# Patient Record
Sex: MALE | Race: WHITE | NOT HISPANIC OR LATINO
[De-identification: names, ages, dates, MRNs, and addresses within clinical notes are randomized per-mention and may not be internally consistent; named-entity substitution may affect disease eponyms.]

---

## 2021-02-16 PROBLEM — Z00.00 ENCOUNTER FOR PREVENTIVE HEALTH EXAMINATION: Status: ACTIVE | Noted: 2021-02-16

## 2021-02-17 ENCOUNTER — APPOINTMENT (OUTPATIENT)
Dept: GASTROENTEROLOGY | Facility: CLINIC | Age: 66
End: 2021-02-17
Payer: COMMERCIAL

## 2021-02-17 VITALS
SYSTOLIC BLOOD PRESSURE: 130 MMHG | WEIGHT: 223 LBS | BODY MASS INDEX: 33.03 KG/M2 | HEART RATE: 73 BPM | HEIGHT: 69 IN | DIASTOLIC BLOOD PRESSURE: 83 MMHG

## 2021-02-17 DIAGNOSIS — Z12.11 ENCOUNTER FOR SCREENING FOR MALIGNANT NEOPLASM OF COLON: ICD-10-CM

## 2021-02-17 PROCEDURE — 99072 ADDL SUPL MATRL&STAF TM PHE: CPT

## 2021-02-17 PROCEDURE — 99202 OFFICE O/P NEW SF 15 MIN: CPT

## 2021-02-17 RX ORDER — ATORVASTATIN CALCIUM 20 MG/1
20 TABLET, FILM COATED ORAL
Refills: 0 | Status: ACTIVE | COMMUNITY

## 2021-02-17 NOTE — PHYSICAL EXAM
[General Appearance - Alert] : alert [General Appearance - In No Acute Distress] : in no acute distress [Auscultation Breath Sounds / Voice Sounds] : lungs were clear to auscultation bilaterally [Heart Rate And Rhythm] : heart rate was normal and rhythm regular [Heart Sounds] : normal S1 and S2 [Heart Sounds Gallop] : no gallops [Heart Sounds Pericardial Friction Rub] : no pericardial rub [Murmurs] : no murmurs [Bowel Sounds] : normal bowel sounds [Abdomen Soft] : soft [Abdomen Tenderness] : non-tender [Abdomen Mass (___ Cm)] : no abdominal mass palpated [] : no hepato-splenomegaly

## 2021-02-17 NOTE — HISTORY OF PRESENT ILLNESS
[de-identified] : Mr. LIS IZQUIERDO is a 65 year old male presents for screening colonoscopy. Patient has no complaints of bowel issues, bleeding, abdominal pain, family history of colon cancer, GERD symptoms. Patient had last colonoscopy in 2011. Patient has a personal history of colon polyps.\par \par

## 2021-08-24 VITALS
BODY MASS INDEX: 33.92 KG/M2 | SYSTOLIC BLOOD PRESSURE: 112 MMHG | HEIGHT: 69 IN | TEMPERATURE: 96.9 F | DIASTOLIC BLOOD PRESSURE: 64 MMHG | WEIGHT: 229 LBS

## 2022-04-13 ENCOUNTER — APPOINTMENT (OUTPATIENT)
Dept: RHEUMATOLOGY | Facility: CLINIC | Age: 67
End: 2022-04-13
Payer: COMMERCIAL

## 2022-04-13 VITALS
HEIGHT: 69 IN | OXYGEN SATURATION: 97 % | TEMPERATURE: 97.6 F | DIASTOLIC BLOOD PRESSURE: 76 MMHG | SYSTOLIC BLOOD PRESSURE: 110 MMHG | HEART RATE: 92 BPM | RESPIRATION RATE: 17 BRPM

## 2022-04-13 DIAGNOSIS — Z82.69 FAMILY HISTORY OF OTHER DISEASES OF THE MUSCULOSKELETAL SYSTEM AND CONNECTIVE TISSUE: ICD-10-CM

## 2022-04-13 DIAGNOSIS — Z80.0 FAMILY HISTORY OF MALIGNANT NEOPLASM OF DIGESTIVE ORGANS: ICD-10-CM

## 2022-04-13 DIAGNOSIS — Z82.5 FAMILY HISTORY OF ASTHMA AND OTHER CHRONIC LOWER RESPIRATORY DISEASES: ICD-10-CM

## 2022-04-13 LAB — URATE UR-MCNC: 12.5 MG/DL

## 2022-04-13 PROCEDURE — 99204 OFFICE O/P NEW MOD 45 MIN: CPT

## 2022-04-14 RX ORDER — FUROSEMIDE 40 MG/1
40 TABLET ORAL
Refills: 0 | Status: DISCONTINUED | COMMUNITY
End: 2022-04-14

## 2022-04-14 RX ORDER — METFORMIN HYDROCHLORIDE 500 MG/1
500 TABLET, COATED ORAL
Refills: 0 | Status: DISCONTINUED | COMMUNITY
End: 2022-04-14

## 2022-04-14 RX ORDER — METFORMIN ER 750 MG 750 MG/1
750 TABLET ORAL
Refills: 0 | Status: ACTIVE | COMMUNITY

## 2022-04-14 RX ORDER — MELOXICAM 15 MG/1
15 TABLET ORAL
Refills: 0 | Status: ACTIVE | COMMUNITY

## 2022-04-14 RX ORDER — ASPIRIN 81 MG
81 TABLET, DELAYED RELEASE (ENTERIC COATED) ORAL
Refills: 0 | Status: ACTIVE | COMMUNITY

## 2022-04-14 RX ORDER — IMIQUIMOD 50 MG/G
5 CREAM TOPICAL
Refills: 0 | Status: ACTIVE | COMMUNITY

## 2022-04-14 RX ORDER — SODIUM SULFATE, POTASSIUM SULFATE, MAGNESIUM SULFATE 17.5; 3.13; 1.6 G/ML; G/ML; G/ML
17.5-3.13-1.6 SOLUTION, CONCENTRATE ORAL
Qty: 1 | Refills: 0 | Status: DISCONTINUED | COMMUNITY
Start: 2021-02-17 | End: 2022-04-14

## 2022-05-18 ENCOUNTER — APPOINTMENT (OUTPATIENT)
Dept: RHEUMATOLOGY | Facility: CLINIC | Age: 67
End: 2022-05-18
Payer: COMMERCIAL

## 2022-05-18 VITALS
BODY MASS INDEX: 36.29 KG/M2 | HEART RATE: 94 BPM | SYSTOLIC BLOOD PRESSURE: 127 MMHG | RESPIRATION RATE: 17 BRPM | HEIGHT: 69 IN | OXYGEN SATURATION: 97 % | WEIGHT: 245 LBS | DIASTOLIC BLOOD PRESSURE: 80 MMHG | TEMPERATURE: 98 F

## 2022-05-18 PROCEDURE — 99214 OFFICE O/P EST MOD 30 MIN: CPT

## 2022-05-18 RX ORDER — BUMETANIDE 2 MG/1
2 TABLET ORAL
Refills: 0 | Status: DISCONTINUED | COMMUNITY
End: 2022-05-18

## 2022-05-18 RX ORDER — DEXAMETHASONE 2 MG/1
2 TABLET ORAL
Qty: 7 | Refills: 0 | Status: DISCONTINUED | COMMUNITY
Start: 2022-04-14 | End: 2022-05-18

## 2022-07-20 ENCOUNTER — APPOINTMENT (OUTPATIENT)
Dept: RHEUMATOLOGY | Facility: CLINIC | Age: 67
End: 2022-07-20

## 2022-07-20 VITALS
HEART RATE: 102 BPM | HEIGHT: 69 IN | RESPIRATION RATE: 17 BRPM | TEMPERATURE: 97.6 F | BODY MASS INDEX: 35.55 KG/M2 | DIASTOLIC BLOOD PRESSURE: 65 MMHG | OXYGEN SATURATION: 96 % | SYSTOLIC BLOOD PRESSURE: 110 MMHG | WEIGHT: 240 LBS

## 2022-07-20 DIAGNOSIS — M13.0 POLYARTHRITIS, UNSPECIFIED: ICD-10-CM

## 2022-07-20 DIAGNOSIS — T50.4X5A: ICD-10-CM

## 2022-07-20 DIAGNOSIS — M25.50 PAIN IN UNSPECIFIED JOINT: ICD-10-CM

## 2022-07-20 PROCEDURE — 99214 OFFICE O/P EST MOD 30 MIN: CPT

## 2022-08-24 PROBLEM — M25.50 POLYARTHRALGIA: Status: ACTIVE | Noted: 2022-04-13

## 2022-08-24 PROBLEM — M13.0 POLYARTHRITIS: Status: ACTIVE | Noted: 2022-04-13

## 2022-08-24 PROBLEM — T50.4X5A: Status: ACTIVE | Noted: 2022-04-14

## 2022-10-10 RX ORDER — FEBUXOSTAT 80 MG/1
80 TABLET ORAL DAILY
Qty: 30 | Refills: 4 | Status: ACTIVE | COMMUNITY
Start: 1900-01-01 | End: 1900-01-01

## 2022-10-26 ENCOUNTER — APPOINTMENT (OUTPATIENT)
Dept: RHEUMATOLOGY | Facility: CLINIC | Age: 67
End: 2022-10-26

## 2022-11-09 ENCOUNTER — APPOINTMENT (OUTPATIENT)
Dept: RHEUMATOLOGY | Facility: CLINIC | Age: 67
End: 2022-11-09

## 2022-11-09 VITALS
HEART RATE: 96 BPM | SYSTOLIC BLOOD PRESSURE: 115 MMHG | DIASTOLIC BLOOD PRESSURE: 70 MMHG | OXYGEN SATURATION: 97 % | TEMPERATURE: 97.4 F

## 2022-11-09 DIAGNOSIS — E11.9 TYPE 2 DIABETES MELLITUS W/OUT COMPLICATIONS: ICD-10-CM

## 2022-11-09 DIAGNOSIS — Z79.899 ENCOUNTER FOR THERAPEUTIC DRUG LVL MONITORING: ICD-10-CM

## 2022-11-09 DIAGNOSIS — N18.9 CHRONIC KIDNEY DISEASE, UNSPECIFIED: ICD-10-CM

## 2022-11-09 DIAGNOSIS — M1A.09X0 IDIOPATHIC CHRONIC GOUT, MULTIPLE SITES, W/OUT TOPHUS (TOPHI): ICD-10-CM

## 2022-11-09 DIAGNOSIS — Z51.81 ENCOUNTER FOR THERAPEUTIC DRUG LVL MONITORING: ICD-10-CM

## 2022-11-09 DIAGNOSIS — I25.10 ATHEROSCLEROTIC HEART DISEASE OF NATIVE CORONARY ARTERY W/OUT ANGINA PECTORIS: ICD-10-CM

## 2022-11-09 PROCEDURE — 99214 OFFICE O/P EST MOD 30 MIN: CPT

## 2022-11-09 RX ORDER — TRAMADOL HYDROCHLORIDE 50 MG/1
50 TABLET, COATED ORAL
Qty: 50 | Refills: 0 | Status: ACTIVE | COMMUNITY
Start: 2022-08-25

## 2022-11-09 RX ORDER — METHYLPREDNISOLONE 4 MG/1
4 TABLET ORAL
Qty: 30 | Refills: 2 | Status: DISCONTINUED | COMMUNITY
Start: 2022-07-20 | End: 2022-11-09

## 2022-11-09 NOTE — PHYSICAL EXAM
[General Appearance - Alert] : alert [General Appearance - In No Acute Distress] : in no acute distress [Sclera] : the sclera and conjunctiva were normal [PERRL With Normal Accommodation] : pupils were equal in size, round, and reactive to light [Extraocular Movements] : extraocular movements were intact [Outer Ear] : the ears and nose were normal in appearance [Oropharynx] : the oropharynx was normal [Neck Appearance] : the appearance of the neck was normal [Neck Cervical Mass (___cm)] : no neck mass was observed [Jugular Venous Distention Increased] : there was no jugular-venous distention [Thyroid Diffuse Enlargement] : the thyroid was not enlarged [Thyroid Nodule] : there were no palpable thyroid nodules [No CVA Tenderness] : no ~M costovertebral angle tenderness [No Spinal Tenderness] : no spinal tenderness [Nail Clubbing] : no clubbing  or cyanosis of the fingernails [Skin Color & Pigmentation] : normal skin color and pigmentation [Skin Turgor] : normal skin turgor [] : no rash [Oriented To Time, Place, And Person] : oriented to person, place, and time [FreeTextEntry1] : Hands- Mild OA changes in B/L hands. L #3 PIP swelling. \par Wrists- Bilateral CMC tenderness \par Elbows- L elbow mild swelling \par Shoulders- normal\par Hips- Reduced external rotation bilaterally.\par Knees- L>R knee swelling and warmth. Patellofemoral crepitus bilaterally without effusion.\par Ankles- B/L swelling and warmth \par Feet- B/L swelling

## 2022-11-09 NOTE — ADDENDUM
[FreeTextEntry1] : I, Flaca Garcia wrote this note acting as a scribe for Dr. Gopi Arteaga MD on Nov 09, 2022 .\par \par I, Dr. Gopi Arteaga MD, ordering physician, have read and attest that all the information, medical decision making and discharge instructions within are true and accurate on 11/09/2022.

## 2022-11-09 NOTE — ASSESSMENT
[FreeTextEntry1] : 67 y/o M with chronic gout consisting of polyarthritis a/w elevated uric acid. He has had a persistent flare recently likely due to incomplete treatment of primary attack. His case is complicated by having CAD and recently placed on febuxostat that has a black box warning for cardiovascular events, which would not be a preferred urate-lowering therapy. He has had allergic reaction and severe GI distress from allopurinol. Furthermore, he has had GI intolerance to colchicine as well. He has tried NSAIDs including indomethacin and meloxicam for joint pain relief which was minimal. Additional gout disease complications is oral hypoglycemic controlled Type II DM which will be affected greatly by steroid dosing and Stage III CKD which increases his risk for gout and complicate probenecid use.  He is also on losartan which has proved to be uricosuric as well, which we will plan tto continue. He has recently been placed on loop diuretics with furosemide and bumetanide, which also increases urate levels and gout flare risks.  This overall makes his chronic gout complicated to both treat acutely in flares and in prophylaxis/prevention of flares with urate lowering agents, as most of the therapies would have generalized to severe contraindications.\par presents for an initial evaluation of gout, currently affecting B/L ankles. He has gout involvement of multiple joints. For his recent flare over the last 4 weeks, he was started on Dexamethasone 2mg BID and Meloxicam 15mg yesterday by PCP and had labwork done which I currently don't have.  He has so far noted improvements in his pain/swelling.\par \par On exam pt exhibits joint swelling in L #3 digit, L elbow, L>R knee, B/L ankles, and warmth in L knee and ankles. \par We discussed multiple treatment options due to current medical conditions and tolerance of medications. Options include Krystexxa infusions for urate-lowering in place of Febuxostat due to cardiac risk, and Kineret injections and sparingly used colchicine for flare treatment.  Will obtain updated labs and urinary uric acid levels to further evaluate his gout and discuss further pending results. Will remain on current med regimen for now and give additional prolonged oral steroid course after completing current course to ensure completion of flare treatment. The patient was given an opportunity to ask questions and all questions were answered to their satisfaction. \par \par - Will obtain labs drawn yesterday with PCP.\par - c/w Febuxostat 80mg/d  - consider Krystexxa infusions if UA increases in next labs \par - Defers use of Kineret 100mg SQ x 5 days for gout flare at this time \par - c/w all other home medications as prescribed  \par \par RTO 3-4 mos

## 2022-11-09 NOTE — HISTORY OF PRESENT ILLNESS
[___ Week(s) Ago] : [unfilled] week(s) ago [FreeTextEntry1] : -Pt overall feeling well. with gout medication. Pt states having no recent gout flares. \par -Pt states having recent L THR with complications post surgery. Had to be hospitalized after surgery again because he was found unresponsive. walks around with reacher now. Completing PE s/p rehab.\par - He remains on Febuxostat 80mg/d and tolerates well. Uses tramadol prn.\par -Pt states stopped taking Meloxicam after L THR. \par -Labs were done yesterday with his PCP, UA done. \par - ROS remains unchanged otherwise.

## 2022-12-15 ENCOUNTER — APPOINTMENT (OUTPATIENT)
Dept: NEUROLOGY | Facility: CLINIC | Age: 67
End: 2022-12-15

## 2022-12-15 VITALS — HEIGHT: 69 IN | BODY MASS INDEX: 32.58 KG/M2 | WEIGHT: 220 LBS

## 2022-12-15 DIAGNOSIS — Z78.9 OTHER SPECIFIED HEALTH STATUS: ICD-10-CM

## 2022-12-15 DIAGNOSIS — G91.2 (IDIOPATHIC) NORMAL PRESSURE HYDROCEPHALUS: ICD-10-CM

## 2022-12-15 PROCEDURE — 99204 OFFICE O/P NEW MOD 45 MIN: CPT

## 2022-12-15 NOTE — DATA REVIEWED
[de-identified] : Had a report of an MRI of his brain done during a hospitalization at [facility] in September 2022.  It showed an old right basal ganglia stroke.  It showed chronic small vessel ischemic type changes.  And there is noted that his ventricles were prominent, possibly due to atrophy.

## 2022-12-15 NOTE — HISTORY OF PRESENT ILLNESS
[FreeTextEntry1] : MemoryInitial office visit December 15, 2022:\par This is a 67-year-old man who presents today with the difficulty walking, loss and tremor.  He has had this problem the walking and memory for several years.  He has a shuffling almost magnetic type gait.  He does not pick his feet up off the floor and his has a reduced swing of his arms.  He also has been having intermittent memory problems per the patient and his wife.  Recently he had an episode of what sounds like cough induced syncope.  He had this while driving and eating.  He also has urinary urgency but he is on diuretics for lower extremity edema.  He is here today for neurologic evaluation.

## 2022-12-15 NOTE — ASSESSMENT
[FreeTextEntry1] : This is a 67-year-old man with symptoms concerning for possibility of normal pressure hydrocephalus.  When I brought this up with him his wife stated that he was told about 6 or 7 years ago that he had normal pressure hydrocephalus but was not exhibiting such symptoms and did not believe it at the time.  At this does not look overtly like Parkinson's disease.  At this point I like to do a nuclear cisternogram to further evaluate for normal pressure hydrocephalus.  If positive will do a large-volume lumbar tap.  If this shows improvement in his gait or exam following the large-volume lumbar puncture I will send him for neurosurgical referral for possible  shunt placement.  I will call him with the results of the cisternogram and if positive at that point order the large-volume lumbar puncture.  Although he initially was complaining of tremor he had no tremor in the office today.  He states the tremor is more with movement rather than at rest.  I will see him back in the office in 3 months, sooner should the need arise.

## 2022-12-15 NOTE — REASON FOR VISIT
[Consultation] : a consultation visit [Other: _____] : [unfilled] [FreeTextEntry1] : Difficulty walking, memory loss, tremor

## 2022-12-15 NOTE — CONSULT LETTER
[Dear  ___] : Dear  [unfilled], [Consult Letter:] : I had the pleasure of evaluating your patient, [unfilled]. [Please see my note below.] : Please see my note below. [Consult Closing:] : Thank you very much for allowing me to participate in the care of this patient.  If you have any questions, please do not hesitate to contact me. [Sincerely,] : Sincerely, [FreeTextEntry3] : Cheng Cedeno M.D., Ph.D. DPN-N\par [facility]\par Neurology at [place]\par Medical Director of Stroke Services\par [facility]\par

## 2022-12-15 NOTE — REVIEW OF SYSTEMS
[As Noted in HPI] : as noted in HPI [Confused or Disoriented] : confusion [Memory Lapses or Loss] : memory loss [Difficulty Walking] : difficulty walking [Negative] : Heme/Lymph [de-identified] : Tremor

## 2022-12-15 NOTE — PHYSICAL EXAM
[General Appearance - Alert] : alert [General Appearance - In No Acute Distress] : in no acute distress [General Appearance - Well Nourished] : well nourished [General Appearance - Well Developed] : well developed [Person] : oriented to person [Place] : oriented to place [Time] : oriented to time [Short Term Intact] : short term memory intact [Remote Intact] : remote memory intact [Registration Intact] : recent registration memory intact [Span Intact] : the attention span was normal [Concentration Intact] : normal concentrating ability [Visual Intact] : visual attention was ~T not ~L decreased [Naming Objects] : no difficulty naming common objects [Repeating Phrases] : no difficulty repeating a phrase [Fluency] : fluency intact [Comprehension] : comprehension intact [Past History] : adequate knowledge of personal past history [Cranial Nerves Optic (II)] : visual acuity intact bilaterally,  visual fields full to confrontation, pupils equal round and reactive to light [Cranial Nerves Oculomotor (III)] : extraocular motion intact [Cranial Nerves Trigeminal (V)] : facial sensation intact symmetrically [Cranial Nerves Facial (VII)] : face symmetrical [Cranial Nerves Vestibulocochlear (VIII)] : hearing was intact bilaterally [Cranial Nerves Glossopharyngeal (IX)] : tongue and palate midline [Cranial Nerves Accessory (XI - Cranial And Spinal)] : head turning and shoulder shrug symmetric [Cranial Nerves Hypoglossal (XII)] : there was no tongue deviation with protrusion [Motor Tone] : muscle tone was normal in all four extremities [No Muscle Atrophy] : normal bulk in all four extremities [Paresis Pronator Drift Right-Sided] : no pronator drift on the right [Paresis Pronator Drift Left-Sided] : no pronator drift on the left [Motor Strength Upper Extremities Bilaterally] : strength was normal in both upper extremities [Motor Strength Lower Extremities Bilaterally] : there was weakness in both lower extremities [Sensation Tactile Decrease] : light touch was intact [Sensation Pain / Temperature Decrease] : pain and temperature was intact [Sensation Vibration Decrease] : vibration was intact [Proprioception] : proprioception was intact [Tremor] : no tremor present [Coordination - Dysmetria Impaired Finger-to-Nose Bilateral] : not present [1+] : Patella left 1+ [FreeTextEntry4] : 3 out of 3 recall [FreeTextEntry6] : Mild lower extremity weakness [FreeTextEntry8] : Magnetic type gait, reduced arm swing.  Typically uses walker but gait was tested without [Sclera] : the sclera and conjunctiva were normal [PERRL With Normal Accommodation] : pupils were equal in size, round, reactive to light, with normal accommodation [Extraocular Movements] : extraocular movements were intact [No APD] : no afferent pupillary defect [No WAYNE] : no internuclear ophthalmoplegia [Full Visual Field] : full visual field

## 2023-01-03 DIAGNOSIS — Z01.818 ENCOUNTER FOR OTHER PREPROCEDURAL EXAMINATION: ICD-10-CM

## 2023-01-17 ENCOUNTER — OUTPATIENT (OUTPATIENT)
Dept: OUTPATIENT SERVICES | Facility: HOSPITAL | Age: 68
LOS: 1 days | End: 2023-01-17
Payer: COMMERCIAL

## 2023-01-17 ENCOUNTER — TRANSCRIPTION ENCOUNTER (OUTPATIENT)
Age: 68
End: 2023-01-17

## 2023-01-17 VITALS
TEMPERATURE: 98 F | SYSTOLIC BLOOD PRESSURE: 117 MMHG | HEART RATE: 87 BPM | DIASTOLIC BLOOD PRESSURE: 62 MMHG | HEIGHT: 69 IN | WEIGHT: 220.02 LBS | OXYGEN SATURATION: 100 % | RESPIRATION RATE: 22 BRPM

## 2023-01-17 DIAGNOSIS — G91.2 (IDIOPATHIC) NORMAL PRESSURE HYDROCEPHALUS: ICD-10-CM

## 2023-01-17 LAB
APPEARANCE CSF: CLEAR — SIGNIFICANT CHANGE UP
COLOR CSF: SIGNIFICANT CHANGE UP
CSF COMMENTS: SIGNIFICANT CHANGE UP
CSF PCR RESULT: SIGNIFICANT CHANGE UP
GLUCOSE BLDC GLUCOMTR-MCNC: 101 MG/DL — HIGH (ref 70–99)
GLUCOSE CSF-MCNC: 76 MG/DLG/24H — HIGH (ref 40–70)
LDH CSF L TO P-CCNC: 15 U/L — SIGNIFICANT CHANGE UP
LDH FLD-CCNC: 15 U/L — SIGNIFICANT CHANGE UP
NEUTROPHILS # CSF: SIGNIFICANT CHANGE UP (ref 0–6)
NRBC NFR CSF: 0 /UL — SIGNIFICANT CHANGE UP (ref 0–5)
PROT CSF-MCNC: 39 MG/DL — SIGNIFICANT CHANGE UP (ref 15–45)
RBC # CSF: 128 /CMM — HIGH (ref 0–1)
TUBE TYPE: SIGNIFICANT CHANGE UP

## 2023-01-17 PROCEDURE — 61055 INJECTION INTO BRAIN CANAL: CPT

## 2023-01-17 PROCEDURE — 87483 CNS DNA AMP PROBE TYPE 12-25: CPT

## 2023-01-17 PROCEDURE — 82945 GLUCOSE OTHER FLUID: CPT

## 2023-01-17 PROCEDURE — 84157 ASSAY OF PROTEIN OTHER: CPT

## 2023-01-17 PROCEDURE — 89051 BODY FLUID CELL COUNT: CPT

## 2023-01-17 PROCEDURE — 62328 DX LMBR SPI PNXR W/FLUOR/CT: CPT

## 2023-01-17 PROCEDURE — 83615 LACTATE (LD) (LDH) ENZYME: CPT

## 2023-01-17 NOTE — ASU DISCHARGE PLAN (ADULT/PEDIATRIC) - NS MD DC FALL RISK RISK
For information on Fall & Injury Prevention, visit: [email or web address] OR  [email or web address] OR  https://www.cdc.gov/steadi/patient.html

## 2023-01-17 NOTE — ASU DISCHARGE PLAN (ADULT/PEDIATRIC) - ASU DC SPECIAL INSTRUCTIONSFT
Notify your primary physician and/or Interventional Radiology IMMEDIATELY if you experience any of the following       - Fever of 101F or 38C       - Chills or Rigors/ Shakes       - Swelling and/or Redness in the area around the biopsy site       - Worsening Pain       - Blood soaked bandages or worsening bleeding       - Lightheadedness and/or dizziness upon standing       - Chest Pain/ Tightness       - Shortness of Breath       - Difficulty walking    If you have a problem that you believe requires IMMEDIATE attention, please go to your NEAREST Emergency Room. If you believe your problem can safely wait until you speak to a physician, please call Interventional Radiology for any concerns.    During Normal Weekday Business Hours- You can contact the Interventional Radiology department during normal business hours via telephone.  During Evenings and Weekends- If you need to contact Interventional Radiology during off hours, do so by calling the hospital and requesting to be connected to the Interventional Radiologist on call.

## 2023-01-20 ENCOUNTER — RESULT REVIEW (OUTPATIENT)
Age: 68
End: 2023-01-20

## 2023-01-20 PROCEDURE — 82962 GLUCOSE BLOOD TEST: CPT

## 2023-01-20 PROCEDURE — 78630 CEREBROSPINAL FLUID SCAN: CPT | Mod: 26

## 2023-01-20 PROCEDURE — A9548: CPT

## 2023-01-20 PROCEDURE — 78630 CEREBROSPINAL FLUID SCAN: CPT

## 2023-01-20 PROCEDURE — 62322 NJX INTERLAMINAR LMBR/SAC: CPT

## 2023-01-20 PROCEDURE — 77003 FLUOROGUIDE FOR SPINE INJECT: CPT

## 2023-01-21 ENCOUNTER — OFFICE (OUTPATIENT)
Dept: URBAN - METROPOLITAN AREA CLINIC 113 | Facility: CLINIC | Age: 68
Setting detail: OPHTHALMOLOGY
End: 2023-01-21
Payer: COMMERCIAL

## 2023-01-21 DIAGNOSIS — E11.9: ICD-10-CM

## 2023-01-21 DIAGNOSIS — H43.393: ICD-10-CM

## 2023-01-21 DIAGNOSIS — H25.13: ICD-10-CM

## 2023-01-21 PROCEDURE — 92004 COMPRE OPH EXAM NEW PT 1/>: CPT | Performed by: OPHTHALMOLOGY

## 2023-01-21 PROCEDURE — 92250 FUNDUS PHOTOGRAPHY W/I&R: CPT | Performed by: OPHTHALMOLOGY

## 2023-01-21 ASSESSMENT — REFRACTION_CURRENTRX
OD_AXIS: 038
OD_OVR_VA: 20/
OD_CYLINDER: -0.50
OS_OVR_VA: 20/
OD_SPHERE: -5.75
OS_SPHERE: -6.00
OS_VPRISM_DIRECTION: PROGS
OD_VPRISM_DIRECTION: SV

## 2023-01-21 ASSESSMENT — REFRACTION_AUTOREFRACTION
OD_SPHERE: -5.25
OS_CYLINDER: -1.50
OS_SPHERE: -5.50
OD_CYLINDER: -1.25
OS_AXIS: 176
OD_AXIS: 022

## 2023-01-21 ASSESSMENT — KERATOMETRY
OS_K1POWER_DIOPTERS: 41.75
OS_AXISANGLE_DEGREES: 081
OD_K2POWER_DIOPTERS: 43.25
OD_K1POWER_DIOPTERS: 42.00
OS_K2POWER_DIOPTERS: 43.25
OD_AXISANGLE_DEGREES: 108
METHOD_AUTO_MANUAL: AUTO

## 2023-01-21 ASSESSMENT — CONFRONTATIONAL VISUAL FIELD TEST (CVF)
OS_FINDINGS: FULL
OD_FINDINGS: FULL

## 2023-01-21 ASSESSMENT — SPHEQUIV_DERIVED
OS_SPHEQUIV: -6.25
OD_SPHEQUIV: -5.875

## 2023-01-21 ASSESSMENT — TONOMETRY
OS_IOP_MMHG: 19
OD_IOP_MMHG: 18

## 2023-01-21 ASSESSMENT — VISUAL ACUITY
OS_BCVA: 20/25+1
OD_BCVA: 20/20-1

## 2023-01-21 ASSESSMENT — AXIALLENGTH_DERIVED
OD_AL: 26.527
OS_AL: 26.772

## 2023-01-24 ENCOUNTER — TRANSCRIPTION ENCOUNTER (OUTPATIENT)
Age: 68
End: 2023-01-24

## 2023-01-24 ENCOUNTER — OUTPATIENT (OUTPATIENT)
Dept: OUTPATIENT SERVICES | Facility: HOSPITAL | Age: 68
LOS: 1 days | End: 2023-01-24
Payer: COMMERCIAL

## 2023-01-24 VITALS
WEIGHT: 246.04 LBS | OXYGEN SATURATION: 99 % | SYSTOLIC BLOOD PRESSURE: 134 MMHG | RESPIRATION RATE: 17 BRPM | DIASTOLIC BLOOD PRESSURE: 76 MMHG | HEART RATE: 83 BPM | TEMPERATURE: 97 F | HEIGHT: 69 IN

## 2023-01-24 DIAGNOSIS — G91.1 OBSTRUCTIVE HYDROCEPHALUS: ICD-10-CM

## 2023-01-24 PROCEDURE — 62328 DX LMBR SPI PNXR W/FLUOR/CT: CPT | Mod: 53

## 2023-01-24 NOTE — BRIEF OPERATIVE NOTE - OPERATION/FINDINGS
Lumbar puncture attempted at two spinal level, needle was midline but unable to advance second to arthritis. Procedure aborted

## 2023-01-25 ENCOUNTER — INPATIENT (INPATIENT)
Facility: HOSPITAL | Age: 68
LOS: 8 days | Discharge: NURSING, REHAB OR LONG-TERM CARE | DRG: 260 | End: 2023-02-03
Admitting: HOSPITALIST
Payer: COMMERCIAL

## 2023-01-25 VITALS — WEIGHT: 246.04 LBS | HEIGHT: 69 IN

## 2023-01-25 DIAGNOSIS — G93.2 BENIGN INTRACRANIAL HYPERTENSION: ICD-10-CM

## 2023-01-25 DIAGNOSIS — Z96.642 PRESENCE OF LEFT ARTIFICIAL HIP JOINT: Chronic | ICD-10-CM

## 2023-01-25 DIAGNOSIS — R55 SYNCOPE AND COLLAPSE: ICD-10-CM

## 2023-01-25 LAB
ALBUMIN SERPL ELPH-MCNC: 3.7 G/DL
ALBUMIN SERPL ELPH-MCNC: 3.7 G/DL — SIGNIFICANT CHANGE UP (ref 3.3–5.2)
ALP BLD-CCNC: 240 U/L
ALP SERPL-CCNC: 180 U/L — HIGH (ref 40–120)
ALT FLD-CCNC: 19 U/L — SIGNIFICANT CHANGE UP
ALT SERPL-CCNC: 34 U/L
AMMONIA BLD-MCNC: 11 UMOL/L — SIGNIFICANT CHANGE UP (ref 11–55)
ANION GAP SERPL CALC-SCNC: 14 MMOL/L
ANION GAP SERPL CALC-SCNC: 14 MMOL/L — SIGNIFICANT CHANGE UP (ref 5–17)
APTT BLD: 32.6 SEC
AST SERPL-CCNC: 34 U/L — SIGNIFICANT CHANGE UP
AST SERPL-CCNC: 35 U/L
BASOPHILS # BLD AUTO: 0.04 K/UL — SIGNIFICANT CHANGE UP (ref 0–0.2)
BASOPHILS # BLD AUTO: 0.05 K/UL
BASOPHILS NFR BLD AUTO: 0.5 % — SIGNIFICANT CHANGE UP (ref 0–2)
BASOPHILS NFR BLD AUTO: 0.6 %
BILIRUB SERPL-MCNC: 1.2 MG/DL
BILIRUB SERPL-MCNC: 1.2 MG/DL — SIGNIFICANT CHANGE UP (ref 0.4–2)
BUN SERPL-MCNC: 30 MG/DL — HIGH (ref 8–20)
BUN SERPL-MCNC: 36 MG/DL
CALCIUM SERPL-MCNC: 9.3 MG/DL — SIGNIFICANT CHANGE UP (ref 8.4–10.5)
CALCIUM SERPL-MCNC: 9.4 MG/DL
CHLORIDE SERPL-SCNC: 101 MMOL/L
CHLORIDE SERPL-SCNC: 96 MMOL/L — SIGNIFICANT CHANGE UP (ref 96–108)
CO2 SERPL-SCNC: 24 MMOL/L
CO2 SERPL-SCNC: 30 MMOL/L — HIGH (ref 22–29)
CREAT SERPL-MCNC: 1.42 MG/DL
CREAT SERPL-MCNC: 1.52 MG/DL — HIGH (ref 0.5–1.3)
EGFR: 51 ML/MIN/1.73M2 — LOW
EGFR: 55 ML/MIN/1.73M2
EOSINOPHIL # BLD AUTO: 0.04 K/UL
EOSINOPHIL # BLD AUTO: 0.06 K/UL — SIGNIFICANT CHANGE UP (ref 0–0.5)
EOSINOPHIL NFR BLD AUTO: 0.5 %
EOSINOPHIL NFR BLD AUTO: 0.7 % — SIGNIFICANT CHANGE UP (ref 0–6)
GLUCOSE SERPL-MCNC: 103 MG/DL — HIGH (ref 70–99)
GLUCOSE SERPL-MCNC: 126 MG/DL
HCT VFR BLD CALC: 36.8 %
HCT VFR BLD CALC: 37.3 % — LOW (ref 39–50)
HGB BLD-MCNC: 11.2 G/DL — LOW (ref 13–17)
HGB BLD-MCNC: 11.4 G/DL
IMM GRANULOCYTES NFR BLD AUTO: 0.2 %
IMM GRANULOCYTES NFR BLD AUTO: 0.4 % — SIGNIFICANT CHANGE UP (ref 0–0.9)
INR PPP: 1.49 RATIO
LYMPHOCYTES # BLD AUTO: 1.26 K/UL
LYMPHOCYTES # BLD AUTO: 1.41 K/UL — SIGNIFICANT CHANGE UP (ref 1–3.3)
LYMPHOCYTES # BLD AUTO: 16.8 % — SIGNIFICANT CHANGE UP (ref 13–44)
LYMPHOCYTES NFR BLD AUTO: 14.9 %
MAGNESIUM SERPL-MCNC: 2.4 MG/DL — SIGNIFICANT CHANGE UP (ref 1.6–2.6)
MAN DIFF?: NORMAL
MCHC RBC-ENTMCNC: 24.9 PG — LOW (ref 27–34)
MCHC RBC-ENTMCNC: 25.9 PG
MCHC RBC-ENTMCNC: 30 GM/DL — LOW (ref 32–36)
MCHC RBC-ENTMCNC: 31 GM/DL
MCV RBC AUTO: 82.9 FL — SIGNIFICANT CHANGE UP (ref 80–100)
MCV RBC AUTO: 83.6 FL
MONOCYTES # BLD AUTO: 1.15 K/UL — HIGH (ref 0–0.9)
MONOCYTES # BLD AUTO: 1.17 K/UL
MONOCYTES NFR BLD AUTO: 13.7 % — SIGNIFICANT CHANGE UP (ref 2–14)
MONOCYTES NFR BLD AUTO: 13.9 %
NEUTROPHILS # BLD AUTO: 5.71 K/UL — SIGNIFICANT CHANGE UP (ref 1.8–7.4)
NEUTROPHILS # BLD AUTO: 5.9 K/UL
NEUTROPHILS NFR BLD AUTO: 67.9 % — SIGNIFICANT CHANGE UP (ref 43–77)
NEUTROPHILS NFR BLD AUTO: 69.9 %
NT-PROBNP SERPL-SCNC: 8378 PG/ML — HIGH (ref 0–300)
PLATELET # BLD AUTO: 298 K/UL
PLATELET # BLD AUTO: 323 K/UL — SIGNIFICANT CHANGE UP (ref 150–400)
POTASSIUM SERPL-MCNC: 4 MMOL/L — SIGNIFICANT CHANGE UP (ref 3.5–5.3)
POTASSIUM SERPL-SCNC: 4 MMOL/L — SIGNIFICANT CHANGE UP (ref 3.5–5.3)
POTASSIUM SERPL-SCNC: 4.5 MMOL/L
PROT SERPL-MCNC: 7.5 G/DL
PROT SERPL-MCNC: 7.8 G/DL — SIGNIFICANT CHANGE UP (ref 6.6–8.7)
PT BLD: 17.3 SEC
RBC # BLD: 4.4 M/UL
RBC # BLD: 4.5 M/UL — SIGNIFICANT CHANGE UP (ref 4.2–5.8)
RBC # FLD: 15.9 %
RBC # FLD: 16.1 % — HIGH (ref 10.3–14.5)
SARS-COV-2 N GENE NPH QL NAA+PROBE: NOT DETECTED
SODIUM SERPL-SCNC: 140 MMOL/L
SODIUM SERPL-SCNC: 140 MMOL/L — SIGNIFICANT CHANGE UP (ref 135–145)
TROPONIN T SERPL-MCNC: 0.06 NG/ML — SIGNIFICANT CHANGE UP (ref 0–0.06)
VIT B12 SERPL-MCNC: 1477 PG/ML — HIGH (ref 232–1245)
WBC # BLD: 8.4 K/UL — SIGNIFICANT CHANGE UP (ref 3.8–10.5)
WBC # FLD AUTO: 8.4 K/UL — SIGNIFICANT CHANGE UP (ref 3.8–10.5)
WBC # FLD AUTO: 8.44 K/UL

## 2023-01-25 PROCEDURE — 73522 X-RAY EXAM HIPS BI 3-4 VIEWS: CPT | Mod: 26

## 2023-01-25 PROCEDURE — 71045 X-RAY EXAM CHEST 1 VIEW: CPT | Mod: 26

## 2023-01-25 PROCEDURE — 93010 ELECTROCARDIOGRAM REPORT: CPT

## 2023-01-25 PROCEDURE — 99223 1ST HOSP IP/OBS HIGH 75: CPT

## 2023-01-25 PROCEDURE — G1004: CPT

## 2023-01-25 PROCEDURE — 70450 CT HEAD/BRAIN W/O DYE: CPT | Mod: 26,MG

## 2023-01-25 PROCEDURE — 99285 EMERGENCY DEPT VISIT HI MDM: CPT

## 2023-01-25 PROCEDURE — 93926 LOWER EXTREMITY STUDY: CPT | Mod: 26,RT

## 2023-01-25 RX ORDER — FUROSEMIDE 40 MG
40 TABLET ORAL ONCE
Refills: 0 | Status: COMPLETED | OUTPATIENT
Start: 2023-01-25 | End: 2023-01-25

## 2023-01-25 NOTE — ED ADULT NURSE NOTE - NSIMPLEMENTINTERV_GEN_ALL_ED
Implemented All Fall Risk Interventions:  [place] to call system. Call bell, personal items and telephone within reach. Instruct patient to call for assistance. Room bathroom lighting operational. Non-slip footwear when patient is off stretcher. Physically safe environment: no spills, clutter or unnecessary equipment. Stretcher in lowest position, wheels locked, appropriate side rails in place. Provide visual cue, wrist band, yellow gown, etc. Monitor gait and stability. Monitor for mental status changes and reorient to person, place, and time. Review medications for side effects contributing to fall risk. Reinforce activity limits and safety measures with patient and family.

## 2023-01-25 NOTE — H&P ADULT - PROBLEM SELECTOR PLAN 1
admit to tele  serial trop  echo, carotid doppler  orthostatic vitals.  NPH contributed ?   cardio consult [place] called  neurology consult requested as well. admit to tele  serial trop  echo, carotid doppler  orthostatic vitals.  NPH contributed ? mr brain, mr C/T/L spine ordered  cardio consult [place] called  neurology consult requested as well.

## 2023-01-25 NOTE — ED PROVIDER NOTE - CARE PLAN
1 Principal Discharge DX:	Syncope and collapse  Secondary Diagnosis:	Near syncope  Secondary Diagnosis:	Fluid overload

## 2023-01-25 NOTE — ED ADULT NURSE NOTE - OBJECTIVE STATEMENT
Patient presents to ED with wife for complaints of syncopal episodes. Patient is alert and oriented at time of assessment and able to make his needs known.

## 2023-01-25 NOTE — H&P ADULT - NSHPPHYSICALEXAM_GEN_ALL_CORE
Vital Signs Last 24 Hrs  T(C): 36.8 (26 Jan 2023 00:04), Max: 36.9 (25 Jan 2023 19:31)  T(F): 98.2 (26 Jan 2023 00:04), Max: 98.4 (25 Jan 2023 19:31)  HR: 91 (26 Jan 2023 00:04) (90 - 91)  BP: 114/71 (26 Jan 2023 00:04) (114/71 - 135/84)  BP(mean): --  RR: 18 (26 Jan 2023 00:04) (18 - 18)  SpO2: 99% (26 Jan 2023 00:04) (96% - 99%)    Parameters below as of 26 Jan 2023 00:04  Patient On (Oxygen Delivery Method): room air    General: obese male in bed not in distress  eyes : PERRL. intact EOM.  HENT: AT, NC.  no throat erythema or exudate.   Neck: supple. no JVD.   Chest: basal crackles bilaterally, no inter costal retractions.   Heart: S1,S2. RRR. no heart murmur. 2 + edema of B/L LE  Abdomen: soft. non-tender. non-distended. obese, + BS.   Ext: no calf tenderness on either side. no new pain in lower extremity joints.  vascular : distal pulses intact.  Neuro: AAO x3. no focal weakness. no speech disorder, cns intact  Skin: B/L lower ext. erythema noted from lower mid calf to B/L feet, as per pt. it is not new , there for long time, no open wound, no sig. warmth on touch.   psych : mood ok, no si/hi

## 2023-01-25 NOTE — ED PROVIDER NOTE - PROGRESS NOTE DETAILS
Herminio AYALA - pt was signed out to me pending labs, ct head and pt had neg ct and was awaiting labs and had recurrent feeling of near syncope, pt was aox3, no changes or loc, normal rhythm, repeat ekg was same, noted to have elevated pro bnp and fluid overload, will try lasix, called cardio consult and pt admitted to medicine on tele

## 2023-01-25 NOTE — H&P ADULT - ASSESSMENT
68 y/o male presented after a syncopal episode, in the ER pt. also noted to be volume over loaded. will admit to tele for further work up.

## 2023-01-25 NOTE — H&P ADULT - PROBLEM SELECTOR PLAN 5
Diagnosis: DVT.  Goal is 2.0 - 3.0     Patient comes to clinic for follow up anticoagulation visit.  Last INR on 3/2/21 was 2.5.  Dose maintained.   Today's INR is 2.2 and is within goal range.    Current warfarin total weekly dose of 25 mg verified.  Informed the INR result is within therapeutic range and instructed to maintain current dose per protocol. Discussed dose and return date of 4/28/21 for next INR. See Anticoagulation flowsheet.  AVS Declined - patient requests an appointment card only.    Dr. Young is in the office today supervising the treatment.    Instructed to contact the clinic with any unusual bleeding or bruising, any changes in medications, diet, health status, lifestyle, or any other changes, questions or concerns. Verbalized understanding of all discussed.      continue statin.

## 2023-01-25 NOTE — ED PROVIDER NOTE - CLINICAL SUMMARY MEDICAL DECISION MAKING FREE TEXT BOX
67yoM; with PMH signif for DM, HTN, HLD, CRI; now p/w syncope.  patient reports working at his desk, stood up to go to fridge and coughed, then dropped to ground. +syncope.  normal exam.  signed out pending ct and re-eval.   Patient signed out to incoming physician.  All decisions regarding the progression of care will be made at their discretion.

## 2023-01-25 NOTE — H&P ADULT - NSICDXPASTMEDICALHX_GEN_ALL_CORE_FT
PAST MEDICAL HISTORY:  Chronic kidney disease     DM (diabetes mellitus)     Gout      PAST MEDICAL HISTORY:  Chronic kidney disease     DM (diabetes mellitus)     Gout     Hyperlipidemia      EOAE (evoked otoacoustic emission)

## 2023-01-25 NOTE — H&P ADULT - NSICDXFAMILYHX_GEN_ALL_CORE_FT
FAMILY HISTORY:  Father  Still living? Unknown  FH: colon cancer, Age at diagnosis: Age Unknown  FH: heart disease, Age at diagnosis: Age Unknown    Mother  Still living? Unknown  FH: COPD (chronic obstructive pulmonary disease), Age at diagnosis: Age Unknown

## 2023-01-25 NOTE — H&P ADULT - PROBLEM SELECTOR PLAN 2
pt. clinically volume over loaded, EF ? heart failure ?   will keep on iv lasix 40 mg bid for now   follow echo  cardiology follow up  lower ext. erythema does not appear cellulitis , possible dermatitis from fluid retention, pt. has no fever, no wbc elevation.  follow renal function closely and adjust lasix per renal function and clinical response.

## 2023-01-25 NOTE — ED PROVIDER NOTE - NS ED ROS FT
Constitutional: (-) fever  (-)chills  (-)sweats  Eyes/ENT: (-)   Cardiovascular: (-) chest pain, (-) palpitations (-) edema   Respiratory: (-) cough, (-) shortness of breath   Gastrointestinal: (-)nausea  (-)vomiting, (-) diarrhea  (-) abdominal pain   :  (-)dysuria, (-)frequency, (-)urgency, (-)hematuria  Musculoskeletal: (-) neck pain, (-) back pain, (-) joint pain  Integumentary: (-) rash, (-) edema  Neurological: (-) headache, (-) altered mental status  (+)LOC
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
## 2023-01-25 NOTE — ED ADULT TRIAGE NOTE - CHIEF COMPLAINT QUOTE
pt A&OX4,  states he was standing, became dizzy and synopsized and fell to ground, c/o right hip, lower back and bilat shoulder pain, hx hydrocephaly, denies head trauma or blood thinners, chest pain, sob, abd pain n/v/d

## 2023-01-25 NOTE — ED PROVIDER NOTE - OBJECTIVE STATEMENT
67yoM; with PMH signif for DM, HTN, HLD, CRI; now p/w syncope.  patient reports working at his desk, stood up to go to fridge and coughed, then dropped to ground. +syncope. +loc.  denies f/c/s. denies n/v. denies cp/sob/palp prior to syncope. denies numbness/tingling.  wife states  patient with increasing weakness, gait instability, and increased episodes of urinary incontinence.  seen by neurology who worked patient up for NPH, found to have elevated opening pressures during LP performed by IR 1 week ago.  denies f/c/s. denies neck stiffness.  seen by Dr. Cedeno who recommended MRI brain and spine, which patient's family scheduled for 1 month from now.    PMH: DM, HTN, HLD, CRI;  SOCIAL: denies smoking

## 2023-01-26 DIAGNOSIS — N18.9 CHRONIC KIDNEY DISEASE, UNSPECIFIED: ICD-10-CM

## 2023-01-26 DIAGNOSIS — E78.5 HYPERLIPIDEMIA, UNSPECIFIED: ICD-10-CM

## 2023-01-26 DIAGNOSIS — R55 SYNCOPE AND COLLAPSE: ICD-10-CM

## 2023-01-26 DIAGNOSIS — E11.9 TYPE 2 DIABETES MELLITUS WITHOUT COMPLICATIONS: ICD-10-CM

## 2023-01-26 DIAGNOSIS — E87.70 FLUID OVERLOAD, UNSPECIFIED: ICD-10-CM

## 2023-01-26 DIAGNOSIS — M10.9 GOUT, UNSPECIFIED: ICD-10-CM

## 2023-01-26 LAB
A1C WITH ESTIMATED AVERAGE GLUCOSE RESULT: 6.7 % — HIGH (ref 4–5.6)
ANION GAP SERPL CALC-SCNC: 12 MMOL/L — SIGNIFICANT CHANGE UP (ref 5–17)
APPEARANCE UR: CLEAR — SIGNIFICANT CHANGE UP
APTT BLD: 30.6 SEC — SIGNIFICANT CHANGE UP (ref 27.5–35.5)
BILIRUB UR-MCNC: NEGATIVE — SIGNIFICANT CHANGE UP
BUN SERPL-MCNC: 31.6 MG/DL — HIGH (ref 8–20)
CALCIUM SERPL-MCNC: 8.9 MG/DL — SIGNIFICANT CHANGE UP (ref 8.4–10.5)
CHLORIDE SERPL-SCNC: 94 MMOL/L — LOW (ref 96–108)
CHOLEST SERPL-MCNC: 89 MG/DL — SIGNIFICANT CHANGE UP
CO2 SERPL-SCNC: 30 MMOL/L — HIGH (ref 22–29)
COLOR SPEC: YELLOW — SIGNIFICANT CHANGE UP
CREAT SERPL-MCNC: 1.38 MG/DL — HIGH (ref 0.5–1.3)
DIFF PNL FLD: NEGATIVE — SIGNIFICANT CHANGE UP
EGFR: 57 ML/MIN/1.73M2 — LOW
ESTIMATED AVERAGE GLUCOSE: 146 MG/DL — HIGH (ref 68–114)
GLUCOSE BLDC GLUCOMTR-MCNC: 131 MG/DL — HIGH (ref 70–99)
GLUCOSE BLDC GLUCOMTR-MCNC: 136 MG/DL — HIGH (ref 70–99)
GLUCOSE BLDC GLUCOMTR-MCNC: 172 MG/DL — HIGH (ref 70–99)
GLUCOSE BLDC GLUCOMTR-MCNC: 229 MG/DL — HIGH (ref 70–99)
GLUCOSE SERPL-MCNC: 186 MG/DL — HIGH (ref 70–99)
GLUCOSE UR QL: 250 MG/DL
HCV AB S/CO SERPL IA: 0.14 S/CO — SIGNIFICANT CHANGE UP (ref 0–0.99)
HCV AB SERPL-IMP: SIGNIFICANT CHANGE UP
HDLC SERPL-MCNC: 29 MG/DL — LOW
INR BLD: 1.46 RATIO — HIGH (ref 0.88–1.16)
KETONES UR-MCNC: NEGATIVE — SIGNIFICANT CHANGE UP
LEUKOCYTE ESTERASE UR-ACNC: NEGATIVE — SIGNIFICANT CHANGE UP
LIPID PNL WITH DIRECT LDL SERPL: 49 MG/DL — SIGNIFICANT CHANGE UP
NITRITE UR-MCNC: NEGATIVE — SIGNIFICANT CHANGE UP
NON HDL CHOLESTEROL: 60 MG/DL — SIGNIFICANT CHANGE UP
PH UR: 7 — SIGNIFICANT CHANGE UP (ref 5–8)
POTASSIUM SERPL-MCNC: 3.5 MMOL/L — SIGNIFICANT CHANGE UP (ref 3.5–5.3)
POTASSIUM SERPL-SCNC: 3.5 MMOL/L — SIGNIFICANT CHANGE UP (ref 3.5–5.3)
PROT UR-MCNC: 15
PROTHROM AB SERPL-ACNC: 17 SEC — HIGH (ref 10.5–13.4)
RAPID RVP RESULT: SIGNIFICANT CHANGE UP
RBC CASTS # UR COMP ASSIST: NEGATIVE /HPF — SIGNIFICANT CHANGE UP (ref 0–4)
SARS-COV-2 RNA SPEC QL NAA+PROBE: SIGNIFICANT CHANGE UP
SODIUM SERPL-SCNC: 136 MMOL/L — SIGNIFICANT CHANGE UP (ref 135–145)
SP GR SPEC: 1.01 — SIGNIFICANT CHANGE UP (ref 1.01–1.02)
TRIGL SERPL-MCNC: 53 MG/DL — SIGNIFICANT CHANGE UP
TROPONIN T SERPL-MCNC: 0.06 NG/ML — SIGNIFICANT CHANGE UP (ref 0–0.06)
UROBILINOGEN FLD QL: NEGATIVE MG/DL — SIGNIFICANT CHANGE UP
WBC UR QL: NEGATIVE /HPF — SIGNIFICANT CHANGE UP (ref 0–5)

## 2023-01-26 PROCEDURE — 93880 EXTRACRANIAL BILAT STUDY: CPT | Mod: 26

## 2023-01-26 PROCEDURE — 99233 SBSQ HOSP IP/OBS HIGH 50: CPT | Mod: 25

## 2023-01-26 PROCEDURE — 93306 TTE W/DOPPLER COMPLETE: CPT | Mod: 26

## 2023-01-26 PROCEDURE — 93970 EXTREMITY STUDY: CPT | Mod: 26

## 2023-01-26 PROCEDURE — 95819 EEG AWAKE AND ASLEEP: CPT | Mod: 26

## 2023-01-26 RX ORDER — GLUCAGON INJECTION, SOLUTION 0.5 MG/.1ML
1 INJECTION, SOLUTION SUBCUTANEOUS ONCE
Refills: 0 | Status: DISCONTINUED | OUTPATIENT
Start: 2023-01-26 | End: 2023-02-03

## 2023-01-26 RX ORDER — ASPIRIN/CALCIUM CARB/MAGNESIUM 324 MG
81 TABLET ORAL DAILY
Refills: 0 | Status: DISCONTINUED | OUTPATIENT
Start: 2023-01-26 | End: 2023-02-03

## 2023-01-26 RX ORDER — INSULIN LISPRO 100/ML
VIAL (ML) SUBCUTANEOUS
Refills: 0 | Status: DISCONTINUED | OUTPATIENT
Start: 2023-01-26 | End: 2023-02-03

## 2023-01-26 RX ORDER — DEXTROSE 50 % IN WATER 50 %
25 SYRINGE (ML) INTRAVENOUS ONCE
Refills: 0 | Status: DISCONTINUED | OUTPATIENT
Start: 2023-01-26 | End: 2023-02-03

## 2023-01-26 RX ORDER — INSULIN LISPRO 100/ML
VIAL (ML) SUBCUTANEOUS AT BEDTIME
Refills: 0 | Status: DISCONTINUED | OUTPATIENT
Start: 2023-01-26 | End: 2023-02-03

## 2023-01-26 RX ORDER — LOSARTAN POTASSIUM 100 MG/1
25 TABLET, FILM COATED ORAL DAILY
Refills: 0 | Status: DISCONTINUED | OUTPATIENT
Start: 2023-01-26 | End: 2023-02-03

## 2023-01-26 RX ORDER — TRAMADOL HYDROCHLORIDE 50 MG/1
25 TABLET ORAL ONCE
Refills: 0 | Status: DISCONTINUED | OUTPATIENT
Start: 2023-01-26 | End: 2023-01-26

## 2023-01-26 RX ORDER — FEBUXOSTAT 40 MG/1
40 TABLET ORAL DAILY
Refills: 0 | Status: DISCONTINUED | OUTPATIENT
Start: 2023-01-26 | End: 2023-01-30

## 2023-01-26 RX ORDER — ATORVASTATIN CALCIUM 80 MG/1
20 TABLET, FILM COATED ORAL AT BEDTIME
Refills: 0 | Status: DISCONTINUED | OUTPATIENT
Start: 2023-01-26 | End: 2023-02-03

## 2023-01-26 RX ORDER — DEXTROSE 50 % IN WATER 50 %
15 SYRINGE (ML) INTRAVENOUS ONCE
Refills: 0 | Status: DISCONTINUED | OUTPATIENT
Start: 2023-01-26 | End: 2023-02-03

## 2023-01-26 RX ORDER — SODIUM CHLORIDE 9 MG/ML
1000 INJECTION, SOLUTION INTRAVENOUS
Refills: 0 | Status: DISCONTINUED | OUTPATIENT
Start: 2023-01-26 | End: 2023-02-03

## 2023-01-26 RX ORDER — FUROSEMIDE 40 MG
40 TABLET ORAL
Refills: 0 | Status: DISCONTINUED | OUTPATIENT
Start: 2023-01-26 | End: 2023-01-27

## 2023-01-26 RX ORDER — HEPARIN SODIUM 5000 [USP'U]/ML
5000 INJECTION INTRAVENOUS; SUBCUTANEOUS EVERY 8 HOURS
Refills: 0 | Status: DISCONTINUED | OUTPATIENT
Start: 2023-01-26 | End: 2023-01-29

## 2023-01-26 RX ORDER — DEXTROSE 50 % IN WATER 50 %
12.5 SYRINGE (ML) INTRAVENOUS ONCE
Refills: 0 | Status: DISCONTINUED | OUTPATIENT
Start: 2023-01-26 | End: 2023-02-03

## 2023-01-26 RX ADMIN — ATORVASTATIN CALCIUM 20 MILLIGRAM(S): 80 TABLET, FILM COATED ORAL at 21:48

## 2023-01-26 RX ADMIN — Medication 40 MILLIGRAM(S): at 06:25

## 2023-01-26 RX ADMIN — Medication 81 MILLIGRAM(S): at 11:58

## 2023-01-26 RX ADMIN — Medication 40 MILLIGRAM(S): at 14:44

## 2023-01-26 RX ADMIN — FEBUXOSTAT 40 MILLIGRAM(S): 40 TABLET ORAL at 11:58

## 2023-01-26 RX ADMIN — TRAMADOL HYDROCHLORIDE 25 MILLIGRAM(S): 50 TABLET ORAL at 20:31

## 2023-01-26 RX ADMIN — Medication 40 MILLIGRAM(S): at 00:55

## 2023-01-26 RX ADMIN — HEPARIN SODIUM 5000 UNIT(S): 5000 INJECTION INTRAVENOUS; SUBCUTANEOUS at 21:47

## 2023-01-26 RX ADMIN — LOSARTAN POTASSIUM 25 MILLIGRAM(S): 100 TABLET, FILM COATED ORAL at 06:25

## 2023-01-26 RX ADMIN — Medication 0: at 21:50

## 2023-01-26 RX ADMIN — HEPARIN SODIUM 5000 UNIT(S): 5000 INJECTION INTRAVENOUS; SUBCUTANEOUS at 06:25

## 2023-01-26 RX ADMIN — Medication 4: at 17:37

## 2023-01-26 RX ADMIN — HEPARIN SODIUM 5000 UNIT(S): 5000 INJECTION INTRAVENOUS; SUBCUTANEOUS at 14:44

## 2023-01-26 NOTE — PATIENT PROFILE ADULT - FALL HARM RISK - HARM RISK INTERVENTIONS

## 2023-01-26 NOTE — EEG REPORT - NS EEG TEXT BOX
[facility]   COMPREHENSIVE EPILEPSY CENTER   REPORT OF ROUTINE VIDEO EEG     [facility]: [address], [place] [address], Ph#: [phone]  [facility]: [address], [place] [address], Ph#: [phone]  [facility]: [address] [place] [address], Ph#: [phone]    Patient Name: LIS IZQUIERDO  Age and : 67y ([birth year])  MRN #: [id number]  Location: [place] [address]  Referring Physician: Debra Ascencio    Study Date: 23    _____________________________________________________________  TECHNICAL INFORMATION    Placement and Labeling of Electrodes:  The EEG was performed utilizing 20 channels referential EEG connections (coronal over temporal over parasagittal montage) using all standard 10-20 electrode placements with EKG.  Recording was at a sampling rate of 256 samples per second per channel.  Time synchronized digital video recording was done simultaneously with EEG recording.  A low light infrared camera was used for low light recording.  Alejandro and seizure detection algorithms were utilized.    _____________________________________________________________  HISTORY    Patient is a 67y old  Male who presents with a chief complaint of syncope and collapse (2023 16:24)      PERTINENT MEDICATION:  none    _____________________________________________________________  STUDY INTERPRETATION    Findings: The background was continuous and reactive. During wakefulness, the posterior dominant rhythm consisted of symmetric, well-modulated 7-8 Hz activity, with amplitude to 30 uV, that attenuated to eye opening.      Background Slowing:  No generalized background slowing was present.    Focal Slowing:   None were present.    Sleep Background:  Drowsiness was characterized by fragmentation, attenuation, and slowing of the background activity.    Stage II sleep transients were not recorded.    Other Non-Epileptiform Findings:  None were present.    Interictal Epileptiform Activity:   None were present.    Events:  No event or seizure recorded.    Activation Procedures:   Hyperventilation was not performed.    Photic stimulation was performed and did not elicit any abnormality.     Artifacts:  Intermittent myogenic and movement artifacts were noted.    ECG:  The heart rate on single channel ECG was predominantly between 80-90 BPM.    _____________________________________________________________  EEG SUMMARY/CLASSIFICATION    Abnormal EEG in the awake, drowsy states.  - Mild generalized slowing.    _____________________________________________________________  EEG IMPRESSION/CLINICAL CORRELATE    Abnormal EEG study.  1. Mild nonspecific diffuse or multifocal cerebral dysfunction.   2. No epileptiform pattern or seizure seen.    _____________________________________________________________    Yuriy Brandt MD  Director, Epilepsy/EMU - [facility]

## 2023-01-26 NOTE — PROGRESS NOTE ADULT - ASSESSMENT
68 y/o M w/ PMH of HTN, CKD IIIa, gout, DM II presented after syncopal event that occured after he coughed.  Pt reports this has happened in the past and w/u was negative.  He denies prodrome of diaphoresis, light headedness, palpitatitons, and was not exerting him self when it happened.  Pt only remembers coughing then waking up on the floor.  Pt has been w/u in the past for syncope and was negative.  On admisison pt found to be hypervolemic on exam and elevated bnp.  He was admitted for chf exacerbation.  Of note, pt has been following w/ Dr. Cedeno outpt for abnormal gait, progressive cognitive deficits and urinary incontinence that has been worsening since November 2022.  Pt was thought to have NPH but had spinal tap 1/20/23 and opening pressure was elevated r/o NPH.        Post tussive syncopal event, suspect vagal and less likely cardiac   - Has happened in the past w/ negative cardiac w/u   - b/l carotid US negative   - Trops negative and EKG w/out ischemic changes     - BNP elevated likely due to hypervolemia   - TTE reviewed and noted above   - EEG pending   - Monitor on telemetry   - Cardio consulted and recs noted        Hypervolemia likely 2/2 decompensated HFpEF  - BNP elevated likely 2/2 chf and ckd   - TTE reviewed and noted above   - Ascites also reported on TTE likely 2/2 congestive hepatopathy   - On IV lasix bid for diuresis   - Monitor renal fnx and lytes given ongoing aggressive diuresis  - Maintain k>4 and Mg>2  - Monitor daily weights, strict I/o's and fluid restrict  - b/l LE doppler negative for DVT  - Cardiology following and recs noted        Hydrocephalus w/ elevated CSF pressures   - Etiology unclear   - NPH ruled out given elevated CSF opening pressures   - MRI Brain, MRI c/t/l spine ordered   - May need CSF flow study   - Pt consulted   - Neurology following and recs noted       CKD IIIa  - At baseline renal fxn   - On IV lasix for diuresis   - Avoid nephrotoxic meds or renally dose if needed  - Nephrology following and recs noted       DM II  - A1c 6.7  - ISS and hypoglycemic protocol       b/l LE stasis dermatitis like skin changes   - Do not suspect cellulitis   - Would benefit from MAYA eventually and outpt vascular f/u       Hx of Gout   - c/w febuxostat      HLD / HTN  - ARB and IV lasix  - c/w statin therapy         VTE ppx: heparin sq    Dispo: remains acute requiring inpt hospitalization.

## 2023-01-26 NOTE — ED ADULT NURSE REASSESSMENT NOTE - NS ED NURSE REASSESS COMMENT FT1
Assumed care of pt at this time, A&Ox4, resp even/unlabored. Pt resting comfortably, admitted to in pt tele bed, continuous  and cardiac monitoring in place, NSR on monitor, rate 86. Pt on supplemental O2, SpO2 96% on 2L NC, bed side rails up, bed locked in lowest position. Pt denies pain/discomfort at this time, no acute distress noted, pt aware of plan of care, awaiting MRI. BL lower extremity edema and redness noted, warm to touch, +pedal pulses

## 2023-01-26 NOTE — PATIENT PROFILE ADULT - FLU SEASON?
Cruz Lane was seen and treated in our emergency department on 1/6/2022.       SCHOOL NOTE  Date: 1/6/2022    To Whom It May concern:    Magaly Jameson  was seen and treated today in the Emergency Department by the following clinician(s):    provider(s):  Attending Provider: Tra Leon DO  Nurse Practitioner: Eloisa Arellano NP    Magaly Jameson should return to school: 1/8/22    Sincerely,          Cesar Lay NP 9:44 PM 01/06/22         Tra Leon DO
Himanshu Gibbons was seen and treated in our emergency department on 1/6/2022.       SCHOOL NOTE  Date: 1/6/2022    To Whom It May concern:    Onelia Sahni  was seen and treated today in the Emergency Department by the following clinician(s):    provider(s):  Attending Provider: Linette Diop DO  Nurse Practitioner: Anthony Braswell NP    Onelia Sahni should return to school: 1/8/22    Sincerely,          Livia Mckenna NP 9:44 PM 01/06/22       Linette Diop DO
Yes...

## 2023-01-26 NOTE — OCCUPATIONAL THERAPY INITIAL EVALUATION ADULT - LOWER BODY DRESSING, PREVIOUS LEVEL OF FUNCTION, OT EVAL
independent Detail Level: Detailed Spontaneous, unlabored and symmetrical Quality 110: Preventive Care And Screening: Influenza Immunization: Influenza Immunization Administered during Influenza season Quality 431: Preventive Care And Screening: Unhealthy Alcohol Use - Screening: Patient screened for unhealthy alcohol use using a single question and scores less than 2 times per year Quality 111:Pneumonia Vaccination Status For Older Adults: Pneumococcal Vaccination Previously Received Quality 226: Preventive Care And Screening: Tobacco Use: Screening And Cessation Intervention: Patient screened for tobacco use and is an ex/non-smoker Quality 130: Documentation Of Current Medications In The Medical Record: Current Medications Documented
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
## 2023-01-26 NOTE — OCCUPATIONAL THERAPY INITIAL EVALUATION ADULT - PERTINENT HX OF CURRENT PROBLEM, REHAB EVAL
As per MD note: The patient is a 67y Male with a history of declining cognitive function since November of 2022. He has also had gait difficulty and urinary incontinence.   An MRI at [facility] reportedly showed ventriculomegaly.  He had seen a neurologist in [place] and no cause was found. He saw Dr Cedeno in our office in December of 2022 and nuclear cisternogram was done.   The study did NOT support a diagnosis of Normal Pressure hydrocephalus. The opening pressure when the study was done was > 40.  Further studies were ordered as outpatient, however, the patient presented to the ER.   He reports that on the day of arrival he was in the kitchen getting some juice and the next thing he knew, he was on the floor. He states that this has happened before on more than one occasion.

## 2023-01-26 NOTE — CONSULT NOTE ADULT - SUBJECTIVE AND OBJECTIVE BOX
[facility]                                        Neu[facility] [place]                                  [address]                                      [address]                                           [place], [place], [address]                                                [phone]        CC: Syncope and hydrocephalus. Possible obstructive    HISTORY:  The patient is a 67y Male with a history of declining cognitive function since November of 2022. He has also had gait difficulty and urinary incontinence.   An MRI at [facility] reportedly showed ventriculomegaly.  He had seen a neurologist in [place] and no cause was found. He saw Dr Cedeno in our office in December of 2022 and nuclear cisternogram was done.   The study did NOT support a diagnosis of Normal Pressure hydrocephalus. The opening pressure when the study was done was > 40.  Further studies were ordered as outpatient, however, the patient presented to the ER.   He reports that on the day of arrival he was in the kitchen getting some juice and the next thing he knew, he was on the floor. He states that this has happened before on more than one occasion.    PAST MEDICAL & SURGICAL HISTORY:  DM (diabetes mellitus)  Gout  Chronic kidney disease  Hyperlipidemia  S/P hip replacement, left    MEDICATIONS  (STANDING):  aspirin enteric coated 81 milliGRAM(s) Oral daily  atorvastatin 20 milliGRAM(s) Oral at bedtime  dextrose 5%. 1000 milliLiter(s) (50 mL/Hr) IV Continuous <Continuous>  febuxostat 40 milliGRAM(s) Oral daily  furosemide   Injectable 40 milliGRAM(s) IV Push two times a day  glucagon  Injectable 1 milliGRAM(s) IntraMuscular once  heparin   Injectable 5000 Unit(s) SubCutaneous every 8 hours  insulin lispro (ADMELOG) corrective regimen sliding scale   SubCutaneous three times a day before meals  insulin lispro (ADMELOG) corrective regimen sliding scale   SubCutaneous at bedtime  losartan 25 milliGRAM(s) Oral daily    MEDICATIONS  (PRN):  dextrose Oral Gel 15 Gram(s) Oral once PRN Blood Glucose LESS THAN 70 milliGRAM(s)/deciliter    Allergies  Erythromycin Base (Other)  Levaquin (Other)  penicillin (Douglass-Earl)    SOCIAL HISTORY:  Never smoker.     FAMILY HISTORY:  FH: heart disease (Father)  FH: colon cancer (Father)  FH: COPD (chronic obstructive pulmonary disease) (Mother)  No known family history of stroke.     ROS:  Constitutional: The patient denies fevers or weight changes.  Neuro: As per HPI.  Eyes: Denies blurry vision.  Ears/nose/throat: Denies Tinnitus.   Cardiac: Denies chest pain. Denies palpitations.  Respiratory: Denies shortness of breath.  GI: Denies abdominal pain, nausea, or vomiting.  : Denies change in urinary pattern.  Integumentary: Denies rash.  Psych: Denies recent mood changes.  Heme: denies easy bleeding/bruising.    Exam:  Vital Signs Last 24 Hrs  T(C): 37.1 (26 Jan 2023 08:00), Max: 37.1 (26 Jan 2023 08:00)  T(F): 98.7 (26 Jan 2023 08:00), Max: 98.7 (26 Jan 2023 08:00)  HR: 80 (26 Jan 2023 08:00) (80 - 91)  BP: 113/75 (26 Jan 2023 08:00) (111/77 - 135/84)  RR: 18 (26 Jan 2023 08:00) (18 - 18)  SpO2: 96% (26 Jan 2023 08:00) (96% - 99%)    Parameters below as of 26 Jan 2023 08:00  Patient On (Oxygen Delivery Method): room air      General: NAD. There is swelling and erythema in both feet to mid calves.   Carotid bruits absent.     Mental status: The patient is awake, alert, and fully oriented. There is no aphasia. There is no dysarthria. There is mild memory difficulty.     Cranial nerves: There is no papilledema. Pupils react symmetrically to light. There is no visual field deficit to confrontation. Extraocular motion is full with no nystagmus.  Facial sensation is intact. Facial musculature is symmetric. Palate elevates symmetrically. Tongue is midline.    Motor: There is normal bulk and tone.  Strength is 5/5 in the right arm and leg.   Strength is 5/5 in the left arm and leg.    Sensation: Dysesthetic to light touch in both feet.     Reflexes: 1+ throughout and plantar responses are flexor.    Cerebellar: There is no dysmetria on finger to nose testing.    LABS:                         11.2   8.40  )-----------( 323      ( 25 Jan 2023 19:59 )             37.3       01-26    136  |  94<L>  |  31.6<H>  ----------------------------<  186<H>  3.5   |  30.0<H>  |  1.38<H>    Ca    8.9      26 Jan 2023 03:18  Mg     2.4     01-25    TPro  7.8  /  Alb  3.7  /  TBili  1.2  /  DBili  x   /  AST  34  /  ALT  19  /  AlkPhos  180<H>  01-25    PT/INR - ( 26 Jan 2023 03:18 )   PT: 17.0 sec;   INR: 1.46 ratio    PTT - ( 26 Jan 2023 03:18 )  PTT:30.6 sec    CSF  WBC: 0  RBC: 128  Protein: 39  Glucose: 76    PCR not detected.     RADIOLOGY   CT head images reviewed (and concur with report): There is no acute pathology.   There is chronic ischemic change.   There is moderate ventriculomegaly.

## 2023-01-26 NOTE — CONSULT NOTE ADULT - ASSESSMENT
The patient is a 67y Male with hydrocephalus noted on prior imaging, clinical symptoms of cognitive decline, gait difficulty, and incontinence, and episodes of syncope.     Hydrocephalus and elevated CSF pressure.  Agree with plan for MRI brain and spine to assess for CSF blockage.   May need CSF flow study.  Mobilize with physical therapy.     Syncope  He reports that work up in the past was negative.  Will check EEG.     Case discussed with Dr Ascencio.
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
## 2023-01-27 LAB
ANION GAP SERPL CALC-SCNC: 13 MMOL/L — SIGNIFICANT CHANGE UP (ref 5–17)
BUN SERPL-MCNC: 31.2 MG/DL — HIGH (ref 8–20)
CALCIUM SERPL-MCNC: 8.7 MG/DL — SIGNIFICANT CHANGE UP (ref 8.4–10.5)
CHLORIDE SERPL-SCNC: 91 MMOL/L — LOW (ref 96–108)
CO2 SERPL-SCNC: 32 MMOL/L — HIGH (ref 22–29)
CREAT SERPL-MCNC: 1.34 MG/DL — HIGH (ref 0.5–1.3)
CULTURE RESULTS: SIGNIFICANT CHANGE UP
EGFR: 59 ML/MIN/1.73M2 — LOW
GLUCOSE BLDC GLUCOMTR-MCNC: 130 MG/DL — HIGH (ref 70–99)
GLUCOSE BLDC GLUCOMTR-MCNC: 160 MG/DL — HIGH (ref 70–99)
GLUCOSE BLDC GLUCOMTR-MCNC: 183 MG/DL — HIGH (ref 70–99)
GLUCOSE BLDC GLUCOMTR-MCNC: 202 MG/DL — HIGH (ref 70–99)
GLUCOSE SERPL-MCNC: 124 MG/DL — HIGH (ref 70–99)
HCT VFR BLD CALC: 34 % — LOW (ref 39–50)
HGB BLD-MCNC: 10.6 G/DL — LOW (ref 13–17)
MAGNESIUM SERPL-MCNC: 2.3 MG/DL — SIGNIFICANT CHANGE UP (ref 1.6–2.6)
MCHC RBC-ENTMCNC: 25.1 PG — LOW (ref 27–34)
MCHC RBC-ENTMCNC: 31.2 GM/DL — LOW (ref 32–36)
MCV RBC AUTO: 80.6 FL — SIGNIFICANT CHANGE UP (ref 80–100)
PLATELET # BLD AUTO: 293 K/UL — SIGNIFICANT CHANGE UP (ref 150–400)
POTASSIUM SERPL-MCNC: 3.2 MMOL/L — LOW (ref 3.5–5.3)
POTASSIUM SERPL-SCNC: 3.2 MMOL/L — LOW (ref 3.5–5.3)
RBC # BLD: 4.22 M/UL — SIGNIFICANT CHANGE UP (ref 4.2–5.8)
RBC # FLD: 16.2 % — HIGH (ref 10.3–14.5)
SODIUM SERPL-SCNC: 136 MMOL/L — SIGNIFICANT CHANGE UP (ref 135–145)
SPECIMEN SOURCE: SIGNIFICANT CHANGE UP
URATE SERPL-MCNC: 3.4 MG/DL — SIGNIFICANT CHANGE UP (ref 3.4–7)
WBC # BLD: 7.58 K/UL — SIGNIFICANT CHANGE UP (ref 3.8–10.5)
WBC # FLD AUTO: 7.58 K/UL — SIGNIFICANT CHANGE UP (ref 3.8–10.5)

## 2023-01-27 PROCEDURE — 99232 SBSQ HOSP IP/OBS MODERATE 35: CPT

## 2023-01-27 PROCEDURE — 72157 MRI CHEST SPINE W/O & W/DYE: CPT | Mod: 26

## 2023-01-27 PROCEDURE — 70553 MRI BRAIN STEM W/O & W/DYE: CPT | Mod: 26

## 2023-01-27 PROCEDURE — 99233 SBSQ HOSP IP/OBS HIGH 50: CPT

## 2023-01-27 PROCEDURE — 72156 MRI NECK SPINE W/O & W/DYE: CPT | Mod: 26

## 2023-01-27 PROCEDURE — 72158 MRI LUMBAR SPINE W/O & W/DYE: CPT | Mod: 26

## 2023-01-27 PROCEDURE — 93010 ELECTROCARDIOGRAM REPORT: CPT

## 2023-01-27 RX ORDER — TRAMADOL HYDROCHLORIDE 50 MG/1
25 TABLET ORAL ONCE
Refills: 0 | Status: DISCONTINUED | OUTPATIENT
Start: 2023-01-27 | End: 2023-01-27

## 2023-01-27 RX ORDER — POTASSIUM CHLORIDE 20 MEQ
40 PACKET (EA) ORAL EVERY 4 HOURS
Refills: 0 | Status: COMPLETED | OUTPATIENT
Start: 2023-01-27 | End: 2023-01-27

## 2023-01-27 RX ORDER — MORPHINE SULFATE 50 MG/1
1 CAPSULE, EXTENDED RELEASE ORAL ONCE
Refills: 0 | Status: DISCONTINUED | OUTPATIENT
Start: 2023-01-27 | End: 2023-01-27

## 2023-01-27 RX ORDER — TRAMADOL HYDROCHLORIDE 50 MG/1
50 TABLET ORAL ONCE
Refills: 0 | Status: DISCONTINUED | OUTPATIENT
Start: 2023-01-27 | End: 2023-01-27

## 2023-01-27 RX ORDER — ONDANSETRON 8 MG/1
4 TABLET, FILM COATED ORAL ONCE
Refills: 0 | Status: COMPLETED | OUTPATIENT
Start: 2023-01-27 | End: 2023-01-27

## 2023-01-27 RX ADMIN — TRAMADOL HYDROCHLORIDE 50 MILLIGRAM(S): 50 TABLET ORAL at 14:30

## 2023-01-27 RX ADMIN — Medication 81 MILLIGRAM(S): at 11:03

## 2023-01-27 RX ADMIN — ATORVASTATIN CALCIUM 20 MILLIGRAM(S): 80 TABLET, FILM COATED ORAL at 22:35

## 2023-01-27 RX ADMIN — Medication 30 MILLIGRAM(S): at 09:32

## 2023-01-27 RX ADMIN — TRAMADOL HYDROCHLORIDE 25 MILLIGRAM(S): 50 TABLET ORAL at 12:32

## 2023-01-27 RX ADMIN — ONDANSETRON 4 MILLIGRAM(S): 8 TABLET, FILM COATED ORAL at 14:15

## 2023-01-27 RX ADMIN — TRAMADOL HYDROCHLORIDE 25 MILLIGRAM(S): 50 TABLET ORAL at 13:35

## 2023-01-27 RX ADMIN — HEPARIN SODIUM 5000 UNIT(S): 5000 INJECTION INTRAVENOUS; SUBCUTANEOUS at 22:35

## 2023-01-27 RX ADMIN — HEPARIN SODIUM 5000 UNIT(S): 5000 INJECTION INTRAVENOUS; SUBCUTANEOUS at 06:04

## 2023-01-27 RX ADMIN — LOSARTAN POTASSIUM 25 MILLIGRAM(S): 100 TABLET, FILM COATED ORAL at 06:04

## 2023-01-27 RX ADMIN — Medication 100 MILLIGRAM(S): at 22:35

## 2023-01-27 RX ADMIN — Medication 40 MILLIEQUIVALENT(S): at 09:32

## 2023-01-27 RX ADMIN — Medication 0: at 23:15

## 2023-01-27 RX ADMIN — Medication 4: at 18:05

## 2023-01-27 RX ADMIN — Medication 40 MILLIGRAM(S): at 06:04

## 2023-01-27 RX ADMIN — Medication 2: at 08:18

## 2023-01-27 RX ADMIN — Medication 40 MILLIEQUIVALENT(S): at 22:55

## 2023-01-27 RX ADMIN — FEBUXOSTAT 40 MILLIGRAM(S): 40 TABLET ORAL at 20:05

## 2023-01-27 NOTE — PROGRESS NOTE ADULT - ASSESSMENT
A) s/p syncope, Stable CRF, Gout, leg rash    P) Suggest  derm,  consult  for  legs, steroid for  gout, follow up labs.

## 2023-01-27 NOTE — PROGRESS NOTE ADULT - ASSESSMENT
66 y/o M w/ PMH of HTN, CKD IIIa, gout, DM II presented after syncopal event that occured after he coughed.  Pt reports this has happened in the past and w/u was negative.  He denies prodrome of diaphoresis, light headedness, palpitatitons, and was not exerting him self when it happened.  Pt only remembers coughing then waking up on the floor.  Pt has been w/u in the past for syncope and was negative.  On admisison pt found to be hypervolemic on exam and elevated bnp.  He was admitted for chf exacerbation.  Of note, pt has been following w/ Dr. Cedeno outpt for abnormal gait, progressive cognitive deficits and urinary incontinence that has been worsening since November 2022.  Pt was thought to have NPH but had spinal tap 1/20/23 and opening pressure was elevated and ruled out NPH.        Post tussive syncopal event, suspect vagal and less likely cardiac   - Has happened in the past w/ negative cardiac w/u   - b/l carotid US negative   - Trops negative and EKG w/out ischemic changes     - BNP elevated likely due to hypervolemia in the setting of CHF and CKD  - TTE reviewed and noted above   - EEG negative for seizures    - Monitor on telemetry   - Cardio consulted and recs noted        Hypervolemia likely 2/2 decompensated HFpEF and CKD  - BNP elevated likely 2/2 chf and ckd   - TTE reviewed and noted above   - Ascites also reported on TTE likely 2/2 congestive hepatopathy   - On IV lasix bid for diuresis   - Monitor renal fnx and lytes given ongoing aggressive diuresis  - Maintain k>4 and Mg>2  - Monitor daily weights, strict I/o's and fluid restrict  - b/l LE doppler negative for DVT  - Cardiology following and recs noted        Hydrocephalus w/ elevated CSF pressures   - Etiology unclear   - NPH ruled out given elevated CSF opening pressures   - MRI Brain, MRI c/t/l spine ordered   - May need CSF flow study   - Pt consulted   - Neurology following and recs noted       Hypokalemia   - will supplement  - Maintain K>4      CKD IIIa  - At baseline renal fxn   - On IV lasix for diuresis   - Avoid nephrotoxic meds or renally dose if needed  - Nephrology following and recs noted       Normocytic anemia   - Likely of chronic dx however could have component of iron deficiency   - Will order iron panel   - Hemodynamically stable and no bleeding   - Monitor CBC and transfuse as needed      Acute Gout   - c/w febuxostat  - Started on short course of steroids       DM II  - A1c 6.7  - ISS and hypoglycemic protocol       b/l LE stasis dermatitis like skin changes   - Do not suspect cellulitis   - Would benefit from MAYA eventually and outpt vascular f/u       HLD / HTN  - ARB and IV lasix  - c/w statin therapy         VTE ppx: heparin sq    Dispo: remains acute requiring inpt hospitalization.

## 2023-01-27 NOTE — PROGRESS NOTE ADULT - ASSESSMENT
67y Male with hydrocephalus noted on prior imaging, clinical symptoms of cognitive decline, gait difficulty, and incontinence, and episodes of syncope.     Hydrocephalus and elevated CSF pressure.  Awaiting MRI brain and spine to assess for CSF blockage.   May need CSF flow study.  Mobilize with physical therapy.     Syncope  He reports that work up in the past was negative.  EEG negative.    Swelling and erythema of feet may have been secondary to gout.   Suggest having rheumatology follow.     Case discussed with Dr Ascencio.

## 2023-01-27 NOTE — PROGRESS NOTE ADULT - ASSESSMENT
Assessment  Recurrent syncope ? vagal ? symptomatic bradycardia  Nonobst CAD normal LV function  HTN  CKD  Ascites ? liver disease    Rec  loop implant today  switch to oral lasix  consider abd lettyo  following loop implant today will FU in office with Dr Ibarra

## 2023-01-28 LAB
ANION GAP SERPL CALC-SCNC: 11 MMOL/L — SIGNIFICANT CHANGE UP (ref 5–17)
BUN SERPL-MCNC: 39.6 MG/DL — HIGH (ref 8–20)
CALCIUM SERPL-MCNC: 9.4 MG/DL — SIGNIFICANT CHANGE UP (ref 8.4–10.5)
CHLORIDE SERPL-SCNC: 90 MMOL/L — LOW (ref 96–108)
CO2 SERPL-SCNC: 34 MMOL/L — HIGH (ref 22–29)
CREAT SERPL-MCNC: 1.61 MG/DL — HIGH (ref 0.5–1.3)
EGFR: 47 ML/MIN/1.73M2 — LOW
FERRITIN SERPL-MCNC: 44 NG/ML — SIGNIFICANT CHANGE UP (ref 30–400)
GLUCOSE BLDC GLUCOMTR-MCNC: 133 MG/DL — HIGH (ref 70–99)
GLUCOSE BLDC GLUCOMTR-MCNC: 158 MG/DL — HIGH (ref 70–99)
GLUCOSE BLDC GLUCOMTR-MCNC: 182 MG/DL — HIGH (ref 70–99)
GLUCOSE BLDC GLUCOMTR-MCNC: 217 MG/DL — HIGH (ref 70–99)
GLUCOSE BLDC GLUCOMTR-MCNC: 230 MG/DL — HIGH (ref 70–99)
GLUCOSE SERPL-MCNC: 141 MG/DL — HIGH (ref 70–99)
HCT VFR BLD CALC: 34.4 % — LOW (ref 39–50)
HGB BLD-MCNC: 11 G/DL — LOW (ref 13–17)
IRON SATN MFR SERPL: 28 UG/DL — LOW (ref 59–158)
IRON SATN MFR SERPL: 7 % — LOW (ref 16–55)
MAGNESIUM SERPL-MCNC: 2.3 MG/DL — SIGNIFICANT CHANGE UP (ref 1.6–2.6)
MCHC RBC-ENTMCNC: 26 PG — LOW (ref 27–34)
MCHC RBC-ENTMCNC: 32 GM/DL — SIGNIFICANT CHANGE UP (ref 32–36)
MCV RBC AUTO: 81.3 FL — SIGNIFICANT CHANGE UP (ref 80–100)
PHOSPHATE SERPL-MCNC: 4.2 MG/DL — SIGNIFICANT CHANGE UP (ref 2.4–4.7)
PLATELET # BLD AUTO: 297 K/UL — SIGNIFICANT CHANGE UP (ref 150–400)
POTASSIUM SERPL-MCNC: 4.2 MMOL/L — SIGNIFICANT CHANGE UP (ref 3.5–5.3)
POTASSIUM SERPL-SCNC: 4.2 MMOL/L — SIGNIFICANT CHANGE UP (ref 3.5–5.3)
RBC # BLD: 4.23 M/UL — SIGNIFICANT CHANGE UP (ref 4.2–5.8)
RBC # FLD: 16.2 % — HIGH (ref 10.3–14.5)
SODIUM SERPL-SCNC: 135 MMOL/L — SIGNIFICANT CHANGE UP (ref 135–145)
TIBC SERPL-MCNC: 420 UG/DL — SIGNIFICANT CHANGE UP (ref 220–430)
TRANSFERRIN SERPL-MCNC: 294 MG/DL — SIGNIFICANT CHANGE UP (ref 180–329)
WBC # BLD: 9.19 K/UL — SIGNIFICANT CHANGE UP (ref 3.8–10.5)
WBC # FLD AUTO: 9.19 K/UL — SIGNIFICANT CHANGE UP (ref 3.8–10.5)

## 2023-01-28 PROCEDURE — 99233 SBSQ HOSP IP/OBS HIGH 50: CPT

## 2023-01-28 RX ADMIN — Medication 4: at 17:14

## 2023-01-28 RX ADMIN — HEPARIN SODIUM 5000 UNIT(S): 5000 INJECTION INTRAVENOUS; SUBCUTANEOUS at 14:55

## 2023-01-28 RX ADMIN — HEPARIN SODIUM 5000 UNIT(S): 5000 INJECTION INTRAVENOUS; SUBCUTANEOUS at 22:30

## 2023-01-28 RX ADMIN — FEBUXOSTAT 40 MILLIGRAM(S): 40 TABLET ORAL at 12:12

## 2023-01-28 RX ADMIN — Medication 81 MILLIGRAM(S): at 12:12

## 2023-01-28 RX ADMIN — HEPARIN SODIUM 5000 UNIT(S): 5000 INJECTION INTRAVENOUS; SUBCUTANEOUS at 05:50

## 2023-01-28 RX ADMIN — Medication 4: at 12:11

## 2023-01-28 RX ADMIN — Medication 30 MILLIGRAM(S): at 05:50

## 2023-01-28 RX ADMIN — LOSARTAN POTASSIUM 25 MILLIGRAM(S): 100 TABLET, FILM COATED ORAL at 05:50

## 2023-01-28 RX ADMIN — ATORVASTATIN CALCIUM 20 MILLIGRAM(S): 80 TABLET, FILM COATED ORAL at 22:30

## 2023-01-28 NOTE — PROGRESS NOTE ADULT - ASSESSMENT
67 yr old male with CKD, diabetes mellitus, gout, CHF, CKD presented after a syncopal episode at home. Patient also with a history of hydrocephalus, follows neurology as outpatient and recent outpatient work up not suggestive of NPH. On admission, he was noted to be in fluid overload, ECHO was ordered. Cardiology, neurology and nephrology consultations were requested. IV Lasix was initiated. ECHO revealed EF 60%. Fluid status improved. MRI brain and spine was ordered, no acute stroke noted on MR brain, chronic degenerative changes noted on MR spine. EEG was done, negative for seizure activity. Loop recorder was offered, patient declined. Patient reported falls and untable gait as an outpatient. Neurology ordered MRI head with CSF flow to further evaluate. Course complicated by acute gout, rheumatology was consulted, advised steroids.    1. Syncope:  Continue telemetry  cardiology and neurology eval appreciated  MRI brain with CSF flow ordered.  declined loop  ECHO noted.  transition to PO Lasix in am.    2. Gout:  Will order IV steroids per rheumatology  pain control    3. Acute diastolic CHF:  Transition to PO Lasix in am  monitor renal function  continue statin, aspirin, Losartan.    4. CKD:  Monitor renal function  nephrology following.    5. Diabetes mellitus:  Monitor FS  sliding scale coverage.    6. DVT ppx:  Heparin.    Discussed with patient.

## 2023-01-28 NOTE — PROGRESS NOTE ADULT - ASSESSMENT
A) s/p syncope, Stable CRF, Gout, leg rash    P) Suggest  derm,  consult  for  legs, steroid for  gout, follow up labs. Loop recorder as per cardiology.

## 2023-01-28 NOTE — CHART NOTE - NSCHARTNOTEFT_GEN_A_CORE
BRIEF CONSULT REQUEST  NOTE    HPI:  Pt well known to me admitted for syncope and neurological changes, now with gout flare. Pt called me in office yesterday reporting his gout flare and increasing pain not controlled.  Staff attempted to communicate with covering team, but was unable - received note pt was on prednisone 30mg daily but still having pain/swelling.              A/P: 67y M w/ chronic gout w/ elevated UA on febuxostat ULT. Has received prednisone courses for flares but typically responds with rebound flare and prolonged symptoms. He most certainly will require longer therapy w/ taper and higher dose. We have discussed Kineret injectable treatment in the past which can avoid hyperglycemia and associated steroid side effects but he has deferred. He can consider this again in the future or inpatient if steroids do not improve symptoms (pt will require pre-testing for TB and other labs).    - solumedrol 60mg IVP x 1 now then prednisone 40mg x 3 days, 20mg x 3 days, 10mg x 3 days, 5mg x 3 days and stop  - requires likely more aggressive blood glucose control while on higher steroids  - pain control- consider low-dose opioid stronger than tramadol if needed since pt reports inadequate control to me yesterday.    WIll follow  Call for any additional questions  [phone]    Gopi Arteaga MD

## 2023-01-28 NOTE — PROGRESS NOTE ADULT - ASSESSMENT
67y Male with hydrocephalus noted on prior imaging, clinical symptoms of cognitive decline, gait difficulty, and incontinence, and episodes of syncope.     Hydrocephalus and elevated CSF pressure.  No sign of blockage of CSF flow.   Will need CSF flow study (ordered).  Mobilize with physical therapy.     Syncope  He reports that work up in the past was negative.  EEG negative.  To have loop recorder implanted.    Swelling and erythema of feet may have been secondary to gout.   Suggest having rheumatology follow.     Discussed with wife at bedside.   Case discussed with Dr Delgadillo.

## 2023-01-29 LAB
ANION GAP SERPL CALC-SCNC: 12 MMOL/L — SIGNIFICANT CHANGE UP (ref 5–17)
BUN SERPL-MCNC: 45.5 MG/DL — HIGH (ref 8–20)
CALCIUM SERPL-MCNC: 9.1 MG/DL — SIGNIFICANT CHANGE UP (ref 8.4–10.5)
CHLORIDE SERPL-SCNC: 91 MMOL/L — LOW (ref 96–108)
CO2 SERPL-SCNC: 30 MMOL/L — HIGH (ref 22–29)
CREAT SERPL-MCNC: 1.42 MG/DL — HIGH (ref 0.5–1.3)
EGFR: 55 ML/MIN/1.73M2 — LOW
GLUCOSE BLDC GLUCOMTR-MCNC: 149 MG/DL — HIGH (ref 70–99)
GLUCOSE BLDC GLUCOMTR-MCNC: 192 MG/DL — HIGH (ref 70–99)
GLUCOSE BLDC GLUCOMTR-MCNC: 267 MG/DL — HIGH (ref 70–99)
GLUCOSE BLDC GLUCOMTR-MCNC: 372 MG/DL — HIGH (ref 70–99)
GLUCOSE SERPL-MCNC: 152 MG/DL — HIGH (ref 70–99)
HCT VFR BLD CALC: 37.1 % — LOW (ref 39–50)
HGB BLD-MCNC: 11.7 G/DL — LOW (ref 13–17)
MAGNESIUM SERPL-MCNC: 2.5 MG/DL — SIGNIFICANT CHANGE UP (ref 1.6–2.6)
MCHC RBC-ENTMCNC: 25.4 PG — LOW (ref 27–34)
MCHC RBC-ENTMCNC: 31.5 GM/DL — LOW (ref 32–36)
MCV RBC AUTO: 80.7 FL — SIGNIFICANT CHANGE UP (ref 80–100)
PHOSPHATE SERPL-MCNC: 3.4 MG/DL — SIGNIFICANT CHANGE UP (ref 2.4–4.7)
PLATELET # BLD AUTO: 321 K/UL — SIGNIFICANT CHANGE UP (ref 150–400)
POTASSIUM SERPL-MCNC: 4 MMOL/L — SIGNIFICANT CHANGE UP (ref 3.5–5.3)
POTASSIUM SERPL-SCNC: 4 MMOL/L — SIGNIFICANT CHANGE UP (ref 3.5–5.3)
RBC # BLD: 4.6 M/UL — SIGNIFICANT CHANGE UP (ref 4.2–5.8)
RBC # FLD: 16.2 % — HIGH (ref 10.3–14.5)
SODIUM SERPL-SCNC: 133 MMOL/L — LOW (ref 135–145)
WBC # BLD: 10.62 K/UL — HIGH (ref 3.8–10.5)
WBC # FLD AUTO: 10.62 K/UL — HIGH (ref 3.8–10.5)

## 2023-01-29 PROCEDURE — 70551 MRI BRAIN STEM W/O DYE: CPT | Mod: 26

## 2023-01-29 PROCEDURE — 72192 CT PELVIS W/O DYE: CPT | Mod: 26

## 2023-01-29 PROCEDURE — 99233 SBSQ HOSP IP/OBS HIGH 50: CPT

## 2023-01-29 RX ORDER — FEBUXOSTAT 40 MG/1
80 TABLET ORAL DAILY
Refills: 0 | Status: DISCONTINUED | OUTPATIENT
Start: 2023-01-29 | End: 2023-01-30

## 2023-01-29 RX ORDER — PANTOPRAZOLE SODIUM 20 MG/1
40 TABLET, DELAYED RELEASE ORAL
Refills: 0 | Status: DISCONTINUED | OUTPATIENT
Start: 2023-01-29 | End: 2023-02-03

## 2023-01-29 RX ORDER — FEBUXOSTAT 40 MG/1
1 TABLET ORAL
Qty: 0 | Refills: 0 | DISCHARGE

## 2023-01-29 RX ORDER — ATORVASTATIN CALCIUM 80 MG/1
1 TABLET, FILM COATED ORAL
Qty: 0 | Refills: 0 | DISCHARGE

## 2023-01-29 RX ORDER — SENNA PLUS 8.6 MG/1
2 TABLET ORAL AT BEDTIME
Refills: 0 | Status: DISCONTINUED | OUTPATIENT
Start: 2023-01-29 | End: 2023-02-03

## 2023-01-29 RX ORDER — TRAMADOL HYDROCHLORIDE 50 MG/1
25 TABLET ORAL THREE TIMES A DAY
Refills: 0 | Status: DISCONTINUED | OUTPATIENT
Start: 2023-01-29 | End: 2023-02-03

## 2023-01-29 RX ORDER — FUROSEMIDE 40 MG
40 TABLET ORAL DAILY
Refills: 0 | Status: DISCONTINUED | OUTPATIENT
Start: 2023-01-29 | End: 2023-02-03

## 2023-01-29 RX ADMIN — Medication 2: at 11:48

## 2023-01-29 RX ADMIN — PANTOPRAZOLE SODIUM 40 MILLIGRAM(S): 20 TABLET, DELAYED RELEASE ORAL at 11:49

## 2023-01-29 RX ADMIN — SENNA PLUS 2 TABLET(S): 8.6 TABLET ORAL at 23:00

## 2023-01-29 RX ADMIN — FEBUXOSTAT 40 MILLIGRAM(S): 40 TABLET ORAL at 16:43

## 2023-01-29 RX ADMIN — Medication 60 MILLIGRAM(S): at 11:51

## 2023-01-29 RX ADMIN — LOSARTAN POTASSIUM 25 MILLIGRAM(S): 100 TABLET, FILM COATED ORAL at 05:56

## 2023-01-29 RX ADMIN — ATORVASTATIN CALCIUM 20 MILLIGRAM(S): 80 TABLET, FILM COATED ORAL at 21:35

## 2023-01-29 RX ADMIN — Medication 81 MILLIGRAM(S): at 11:46

## 2023-01-29 RX ADMIN — HEPARIN SODIUM 5000 UNIT(S): 5000 INJECTION INTRAVENOUS; SUBCUTANEOUS at 05:56

## 2023-01-29 RX ADMIN — HEPARIN SODIUM 5000 UNIT(S): 5000 INJECTION INTRAVENOUS; SUBCUTANEOUS at 11:47

## 2023-01-29 RX ADMIN — Medication 6: at 21:35

## 2023-01-29 RX ADMIN — Medication 30 MILLIGRAM(S): at 05:56

## 2023-01-29 RX ADMIN — Medication 40 MILLIGRAM(S): at 16:43

## 2023-01-29 RX ADMIN — Medication 6: at 16:42

## 2023-01-29 NOTE — PROGRESS NOTE ADULT - ASSESSMENT
67 yr old male with CKD, diabetes mellitus, gout, CHF, CKD presented after a syncopal episode at home. Patient also with a history of hydrocephalus, follows neurology as outpatient and recent outpatient work up not suggestive of NPH. On admission, he was noted to be in fluid overload, ECHO was ordered. Cardiology, neurology and nephrology consultations were requested. IV Lasix was initiated. ECHO revealed EF 60%. Fluid status improved. MRI brain and spine was ordered, no acute stroke noted on MR brain, chronic degenerative changes noted on MR spine. EEG was done, negative for seizure activity. Loop recorder was offered, patient declined. Patient reported falls and untable gait as an outpatient. Neurology ordered MRI head with CSF flow to further evaluate. Course complicated by acute gout, rheumatology was consulted, advised steroids. MR with CSF flow was done, normal CSF flow through the third ventricle, basal cisterns, cerebral aqueduct, fourth ventricle, and foramen magnum.      1. Syncope:  Continue telemetry  cardiology and neurology eval appreciated  MRI brain with CSF flow done, normal study.  declined loop  ECHO noted.  PO Lasix.    2. Gout:  IV steroids and Prednisone taper.  Rheumatology follow up noted.    3. Acute diastolic CHF:  improving  PO Lasix.  monitor renal function  continue statin, aspirin, Losartan.    4. CKD:  Monitor renal function  nephrology following.    5. Diabetes mellitus:  Monitor FS  sliding scale coverage.    6. DVT ppx:  Heparin.    Discussed with patient.  updated wife, Fidelia.   67 yr old male with CKD, diabetes mellitus, gout, CHF, CKD presented after a syncopal episode at home. Patient also with a history of hydrocephalus, follows neurology as outpatient and recent outpatient work up not suggestive of NPH. On admission, he was noted to be in fluid overload, ECHO was ordered. Cardiology, neurology and nephrology consultations were requested. IV Lasix was initiated. ECHO revealed EF 60%. Fluid status improved. MRI brain and spine was ordered, no acute stroke noted on MR brain, chronic degenerative changes noted on MR spine. EEG was done, negative for seizure activity. Loop recorder was offered, patient declined. Patient reported falls and untable gait as an outpatient. Neurology ordered MRI head with CSF flow to further evaluate. Course complicated by acute gout, rheumatology was consulted, advised steroids. MR with CSF flow was done, normal CSF flow through the third ventricle, basal cisterns, cerebral aqueduct, fourth ventricle, and foramen magnum.      1. Syncope:  Continue telemetry  cardiology and neurology eval appreciated  MRI brain with CSF flow done, normal study.  declined loop, now they are agreeable to proceed. Cardiology follow up.  ECHO noted.  PO Lasix.    2. Gout:  IV steroids and Prednisone taper.  Rheumatology follow up noted.    3. Acute diastolic CHF:  improving  PO Lasix.  monitor renal function  continue statin, aspirin, Losartan.    4. CKD:  Monitor renal function  nephrology following.    5. Diabetes mellitus:  Monitor FS  sliding scale coverage.    6. DVT ppx:  Heparin, hold today.    Discussed with patient.  updated wife, Fidelia.

## 2023-01-30 LAB
ANION GAP SERPL CALC-SCNC: 12 MMOL/L — SIGNIFICANT CHANGE UP (ref 5–17)
ANION GAP SERPL CALC-SCNC: 13 MMOL/L — SIGNIFICANT CHANGE UP (ref 5–17)
BASOPHILS # BLD AUTO: 0 K/UL — SIGNIFICANT CHANGE UP (ref 0–0.2)
BASOPHILS NFR BLD AUTO: 0 % — SIGNIFICANT CHANGE UP (ref 0–2)
BUN SERPL-MCNC: 49.5 MG/DL — HIGH (ref 8–20)
BUN SERPL-MCNC: 50 MG/DL — HIGH (ref 8–20)
CALCIUM SERPL-MCNC: 8.8 MG/DL — SIGNIFICANT CHANGE UP (ref 8.4–10.5)
CALCIUM SERPL-MCNC: 9 MG/DL — SIGNIFICANT CHANGE UP (ref 8.4–10.5)
CHLORIDE SERPL-SCNC: 90 MMOL/L — LOW (ref 96–108)
CHLORIDE SERPL-SCNC: 91 MMOL/L — LOW (ref 96–108)
CO2 SERPL-SCNC: 28 MMOL/L — SIGNIFICANT CHANGE UP (ref 22–29)
CO2 SERPL-SCNC: 29 MMOL/L — SIGNIFICANT CHANGE UP (ref 22–29)
CREAT SERPL-MCNC: 1.45 MG/DL — HIGH (ref 0.5–1.3)
CREAT SERPL-MCNC: 1.6 MG/DL — HIGH (ref 0.5–1.3)
EGFR: 48 ML/MIN/1.73M2 — LOW
EGFR: 53 ML/MIN/1.73M2 — LOW
EOSINOPHIL # BLD AUTO: 0 K/UL — SIGNIFICANT CHANGE UP (ref 0–0.5)
EOSINOPHIL NFR BLD AUTO: 0 % — SIGNIFICANT CHANGE UP (ref 0–6)
GLUCOSE BLDC GLUCOMTR-MCNC: 220 MG/DL — HIGH (ref 70–99)
GLUCOSE BLDC GLUCOMTR-MCNC: 265 MG/DL — HIGH (ref 70–99)
GLUCOSE BLDC GLUCOMTR-MCNC: 266 MG/DL — HIGH (ref 70–99)
GLUCOSE BLDC GLUCOMTR-MCNC: 334 MG/DL — HIGH (ref 70–99)
GLUCOSE BLDC GLUCOMTR-MCNC: 335 MG/DL — HIGH (ref 70–99)
GLUCOSE SERPL-MCNC: 303 MG/DL — HIGH (ref 70–99)
GLUCOSE SERPL-MCNC: 339 MG/DL — HIGH (ref 70–99)
HCT VFR BLD CALC: 36.2 % — LOW (ref 39–50)
HGB BLD-MCNC: 11.4 G/DL — LOW (ref 13–17)
IMM GRANULOCYTES NFR BLD AUTO: 0.3 % — SIGNIFICANT CHANGE UP (ref 0–0.9)
LYMPHOCYTES # BLD AUTO: 0.58 K/UL — LOW (ref 1–3.3)
LYMPHOCYTES # BLD AUTO: 6.1 % — LOW (ref 13–44)
MAGNESIUM SERPL-MCNC: 2.3 MG/DL — SIGNIFICANT CHANGE UP (ref 1.6–2.6)
MAGNESIUM SERPL-MCNC: 2.5 MG/DL — SIGNIFICANT CHANGE UP (ref 1.8–2.6)
MCHC RBC-ENTMCNC: 25.1 PG — LOW (ref 27–34)
MCHC RBC-ENTMCNC: 31.5 GM/DL — LOW (ref 32–36)
MCV RBC AUTO: 79.7 FL — LOW (ref 80–100)
MONOCYTES # BLD AUTO: 0.86 K/UL — SIGNIFICANT CHANGE UP (ref 0–0.9)
MONOCYTES NFR BLD AUTO: 9.1 % — SIGNIFICANT CHANGE UP (ref 2–14)
NEUTROPHILS # BLD AUTO: 7.98 K/UL — HIGH (ref 1.8–7.4)
NEUTROPHILS NFR BLD AUTO: 84.5 % — HIGH (ref 43–77)
PHOSPHATE SERPL-MCNC: 3.3 MG/DL — SIGNIFICANT CHANGE UP (ref 2.4–4.7)
PHOSPHATE SERPL-MCNC: 3.4 MG/DL — SIGNIFICANT CHANGE UP (ref 2.4–4.7)
PLATELET # BLD AUTO: 327 K/UL — SIGNIFICANT CHANGE UP (ref 150–400)
POTASSIUM SERPL-MCNC: 4.3 MMOL/L — SIGNIFICANT CHANGE UP (ref 3.5–5.3)
POTASSIUM SERPL-MCNC: 4.6 MMOL/L — SIGNIFICANT CHANGE UP (ref 3.5–5.3)
POTASSIUM SERPL-SCNC: 4.3 MMOL/L — SIGNIFICANT CHANGE UP (ref 3.5–5.3)
POTASSIUM SERPL-SCNC: 4.6 MMOL/L — SIGNIFICANT CHANGE UP (ref 3.5–5.3)
PYRIDOXAL PHOS SERPL-MCNC: 15.9 UG/L — SIGNIFICANT CHANGE UP (ref 3.4–65.2)
RBC # BLD: 4.54 M/UL — SIGNIFICANT CHANGE UP (ref 4.2–5.8)
RBC # FLD: 16.2 % — HIGH (ref 10.3–14.5)
SODIUM SERPL-SCNC: 131 MMOL/L — LOW (ref 135–145)
SODIUM SERPL-SCNC: 132 MMOL/L — LOW (ref 135–145)
TROPONIN T SERPL-MCNC: 0.04 NG/ML — SIGNIFICANT CHANGE UP (ref 0–0.06)
WBC # BLD: 9.45 K/UL — SIGNIFICANT CHANGE UP (ref 3.8–10.5)
WBC # FLD AUTO: 9.45 K/UL — SIGNIFICANT CHANGE UP (ref 3.8–10.5)

## 2023-01-30 PROCEDURE — 99232 SBSQ HOSP IP/OBS MODERATE 35: CPT

## 2023-01-30 PROCEDURE — 99233 SBSQ HOSP IP/OBS HIGH 50: CPT

## 2023-01-30 PROCEDURE — 93010 ELECTROCARDIOGRAM REPORT: CPT

## 2023-01-30 RX ORDER — HEPARIN SODIUM 5000 [USP'U]/ML
5000 INJECTION INTRAVENOUS; SUBCUTANEOUS EVERY 8 HOURS
Refills: 0 | Status: DISCONTINUED | OUTPATIENT
Start: 2023-01-31 | End: 2023-02-03

## 2023-01-30 RX ORDER — SIMETHICONE 80 MG/1
80 TABLET, CHEWABLE ORAL ONCE
Refills: 0 | Status: DISCONTINUED | OUTPATIENT
Start: 2023-01-30 | End: 2023-01-30

## 2023-01-30 RX ORDER — INSULIN LISPRO 100/ML
4 VIAL (ML) SUBCUTANEOUS
Refills: 0 | Status: DISCONTINUED | OUTPATIENT
Start: 2023-01-30 | End: 2023-01-31

## 2023-01-30 RX ORDER — INSULIN GLARGINE 100 [IU]/ML
8 INJECTION, SOLUTION SUBCUTANEOUS AT BEDTIME
Refills: 0 | Status: DISCONTINUED | OUTPATIENT
Start: 2023-01-30 | End: 2023-01-31

## 2023-01-30 RX ADMIN — PANTOPRAZOLE SODIUM 40 MILLIGRAM(S): 20 TABLET, DELAYED RELEASE ORAL at 05:27

## 2023-01-30 RX ADMIN — Medication 2: at 21:45

## 2023-01-30 RX ADMIN — Medication 8: at 17:24

## 2023-01-30 RX ADMIN — Medication 81 MILLIGRAM(S): at 17:21

## 2023-01-30 RX ADMIN — LOSARTAN POTASSIUM 25 MILLIGRAM(S): 100 TABLET, FILM COATED ORAL at 05:26

## 2023-01-30 RX ADMIN — Medication 100 MILLIGRAM(S): at 15:29

## 2023-01-30 RX ADMIN — Medication 4: at 07:56

## 2023-01-30 RX ADMIN — Medication 6: at 12:11

## 2023-01-30 RX ADMIN — Medication 30 MILLILITER(S): at 01:51

## 2023-01-30 RX ADMIN — Medication 4 UNIT(S): at 17:24

## 2023-01-30 RX ADMIN — INSULIN GLARGINE 8 UNIT(S): 100 INJECTION, SOLUTION SUBCUTANEOUS at 21:45

## 2023-01-30 RX ADMIN — Medication 40 MILLIGRAM(S): at 05:26

## 2023-01-30 RX ADMIN — ATORVASTATIN CALCIUM 20 MILLIGRAM(S): 80 TABLET, FILM COATED ORAL at 21:44

## 2023-01-30 NOTE — PHYSICAL THERAPY INITIAL EVALUATION ADULT - MANUAL MUSCLE TESTING RESULTS, REHAB EVAL
bilateral shoulder flex WFL, elbow flex WFL; bilateral hip flex 3-/5, knee ext 3+/5, ankle df 3+/5*(pt with plantar foot sensitivity)

## 2023-01-30 NOTE — PHYSICAL THERAPY INITIAL EVALUATION ADULT - ACTIVE RANGE OF MOTION EXAMINATION, REHAB EVAL
left shoulder flex to ~90 degrees(limited by pain), elbow WFL/Right UE Active ROM was WFL (within functional limits)/bilateral  lower extremity Active ROM was WFL (within functional limits)

## 2023-01-30 NOTE — CHART NOTE - NSCHARTNOTEFT_GEN_A_CORE
Pt seen and examined at bedside for c/o midsternal nonradiating chest pain after drinking soda  Denies SOB, palpitations, n/v/d/c, HA, dizziness, blurry vision, abdominal pain  All vital signs stable  Resting comfortably in NAD  +S1, S2, lungs CTABL  EKG  BMP, Mag, Phos Trop  Likely not cardiac in nature, suspect reflux after drinking soda  Maalox ordered  RN to notify of any acute change in pt status Pt seen and examined at bedside for c/o midsternal nonradiating chest pain after drinking soda  Denies SOB, palpitations, n/v/d/c, HA, dizziness, blurry vision, abdominal pain  All vital signs stable  Resting comfortably in NAD  +S1, S2, lungs CTABL  EKG without acute change  BMP, Mag, Phos Trop  Likely not cardiac in nature, suspect reflux after drinking soda  Maalox ordered  RN to notify of any acute change in pt status

## 2023-01-30 NOTE — PHYSICAL THERAPY INITIAL EVALUATION ADULT - PERTINENT HX OF CURRENT PROBLEM, REHAB EVAL
67 yr old male with CKD, diabetes mellitus, gout, CHF, CKD presented after a syncopal episode at home. Patient also with a history of hydrocephalus, follows neurology as outpatient and recent outpatient work up not suggestive of NPH. On admission, he was noted to be in fluid overload, ECHO was ordered. Cardiology, neurology and nephrology consultations were requested. IV Lasix was initiated. ECHO revealed EF 60%. Fluid status improved. MRI brain and spine was ordered, no acute stroke noted on MR brain, chronic degenerative changes noted on MR spine. EEG was done, negative for seizure activity. Loop recorder was offered, patient declined. Patient reported falls and untable gait as an outpatient. Neurology ordered MRI head with CSF flow to further evaluate. Course complicated by acute gout, rheumatology was consulted, advised steroids. MR with CSF flow was done, normal CSF flow through the third ventricle, basal cisterns, cerebral aqueduct, fourth ventricle, and foramen magnum.

## 2023-01-30 NOTE — PROGRESS NOTE ADULT - ASSESSMENT
67y Male with hydrocephalus noted on prior imaging, clinical symptoms of cognitive decline, gait difficulty, and incontinence, and episodes of syncope.     Suspected hydrocephalus and elevated CSF pressure.  No hydrocephalus on current MRI  No sign of blockage of CSF flow.   Normal  CSF flow study   Unclear why ICP was elevated, but there is no clear central reason for this  I spoke with Dr Castle who was unable to repeat LP due to physical barriers- he said he barely was able to get the needle into the canal for the cisternogram.  He is unable to repeat LP  Mobilize with physical therapy.   He will need inpatient rehab on discharge  Will consider outpatient neuropsychology eval for memory testing and a Jose scan for Parkinson's disease    Syncope  He reports that work up in the past was negative.  EEG negative.  He is  pending loop recorder implant.    Swelling and erythema of feet may have been secondary to gout.   Suggest having rheumatology follow.     Discussed with wife at bedside.   Case discussed with Dr Delgadillo.    will follow with you    Cheng Cedeno MD PhD   [id number]

## 2023-01-30 NOTE — PROGRESS NOTE ADULT - ASSESSMENT
67 yr old male with CKD, diabetes mellitus, gout, CHF, CKD presented after a syncopal episode at home. Patient also with a history of hydrocephalus, follows neurology as outpatient and recent outpatient work up not suggestive of NPH. On admission, he was noted to be in fluid overload, ECHO was ordered. Cardiology, neurology and nephrology consultations were requested. IV Lasix was initiated. ECHO revealed EF 60%. Fluid status improved. MRI brain and spine was ordered, no acute stroke noted on MR brain, chronic degenerative changes noted on MR spine. EEG was done, negative for seizure activity. Loop recorder was offered, patient declined. Patient reported falls and untable gait as an outpatient. Neurology ordered MRI head with CSF flow to further evaluate. Course complicated by acute gout, rheumatology was consulted, advised steroids. MR with CSF flow was done, normal CSF flow through the third ventricle, basal cisterns, cerebral aqueduct, fourth ventricle, and foramen magnum. Patient and family later agreed to proceed with loop recorder placement. IV steroids followed by Prednisone taper was initiated for gout by rheumatology.      1. Syncope:  Continue telemetry  cardiology and neurology eval appreciated  MRI brain with CSF flow done, normal study.  plan for loop recorder today.  D/w neurology, suggest outpatient follow up and continuing rehab.    2. Gout:  continue Prednisone taper and pain control  outpatient rheumatology follow up.    3. Acute diastolic CHF:  improving  PO Lasix.  monitor renal function  continue statin, aspirin, Losartan.    4. CKD:  Monitor renal function  nephrology following.    5. Diabetes mellitus:  now uncontrolled given Prednisone.  Will start Lantus 8 units and pre meal 3 units, continue sliding scale coverage.     6. DVT ppx:  resume Heparin.     Discussed with patient.  updated wife, Fidelia.

## 2023-01-30 NOTE — PHYSICAL THERAPY INITIAL EVALUATION ADULT - ADDITIONAL COMMENTS
pt states he lives with his wife in a multi-level house with 7 steps to enter and 7 to first floor bedroom and bath where he sleeps. has been using a rollator for amb and works from home. wife disabled. does not drive.

## 2023-01-30 NOTE — PHYSICAL THERAPY INITIAL EVALUATION ADULT - PASSIVE RANGE OF MOTION EXAMINATION, REHAB EVAL
left shoulder flex to ~90 degrees(limited by pain), elbow WFL/Right UE Passive ROM was WFL (within functional limits)/bilateral lower extremity Passive ROM was WFL (within functional limits)

## 2023-01-31 LAB
ANION GAP SERPL CALC-SCNC: 12 MMOL/L — SIGNIFICANT CHANGE UP (ref 5–17)
BASOPHILS # BLD AUTO: 0.03 K/UL — SIGNIFICANT CHANGE UP (ref 0–0.2)
BASOPHILS NFR BLD AUTO: 0.2 % — SIGNIFICANT CHANGE UP (ref 0–2)
BUN SERPL-MCNC: 51.5 MG/DL — HIGH (ref 8–20)
CALCIUM SERPL-MCNC: 9.2 MG/DL — SIGNIFICANT CHANGE UP (ref 8.4–10.5)
CHLORIDE SERPL-SCNC: 93 MMOL/L — LOW (ref 96–108)
CO2 SERPL-SCNC: 31 MMOL/L — HIGH (ref 22–29)
CREAT SERPL-MCNC: 1.38 MG/DL — HIGH (ref 0.5–1.3)
EGFR: 57 ML/MIN/1.73M2 — LOW
EOSINOPHIL # BLD AUTO: 0.01 K/UL — SIGNIFICANT CHANGE UP (ref 0–0.5)
EOSINOPHIL NFR BLD AUTO: 0.1 % — SIGNIFICANT CHANGE UP (ref 0–6)
GLUCOSE BLDC GLUCOMTR-MCNC: 124 MG/DL — HIGH (ref 70–99)
GLUCOSE BLDC GLUCOMTR-MCNC: 145 MG/DL — HIGH (ref 70–99)
GLUCOSE BLDC GLUCOMTR-MCNC: 231 MG/DL — HIGH (ref 70–99)
GLUCOSE BLDC GLUCOMTR-MCNC: 313 MG/DL — HIGH (ref 70–99)
GLUCOSE SERPL-MCNC: 133 MG/DL — HIGH (ref 70–99)
HCT VFR BLD CALC: 37 % — LOW (ref 39–50)
HGB BLD-MCNC: 11.5 G/DL — LOW (ref 13–17)
IMM GRANULOCYTES NFR BLD AUTO: 0.5 % — SIGNIFICANT CHANGE UP (ref 0–0.9)
LYMPHOCYTES # BLD AUTO: 1.95 K/UL — SIGNIFICANT CHANGE UP (ref 1–3.3)
LYMPHOCYTES # BLD AUTO: 15.4 % — SIGNIFICANT CHANGE UP (ref 13–44)
MAGNESIUM SERPL-MCNC: 2.3 MG/DL — SIGNIFICANT CHANGE UP (ref 1.6–2.6)
MCHC RBC-ENTMCNC: 24.9 PG — LOW (ref 27–34)
MCHC RBC-ENTMCNC: 31.1 GM/DL — LOW (ref 32–36)
MCV RBC AUTO: 80.3 FL — SIGNIFICANT CHANGE UP (ref 80–100)
MONOCYTES # BLD AUTO: 1.27 K/UL — HIGH (ref 0–0.9)
MONOCYTES NFR BLD AUTO: 10 % — SIGNIFICANT CHANGE UP (ref 2–14)
NEUTROPHILS # BLD AUTO: 9.36 K/UL — HIGH (ref 1.8–7.4)
NEUTROPHILS NFR BLD AUTO: 73.8 % — SIGNIFICANT CHANGE UP (ref 43–77)
PHOSPHATE SERPL-MCNC: 3.4 MG/DL — SIGNIFICANT CHANGE UP (ref 2.4–4.7)
PLATELET # BLD AUTO: 300 K/UL — SIGNIFICANT CHANGE UP (ref 150–400)
POTASSIUM SERPL-MCNC: 4 MMOL/L — SIGNIFICANT CHANGE UP (ref 3.5–5.3)
POTASSIUM SERPL-SCNC: 4 MMOL/L — SIGNIFICANT CHANGE UP (ref 3.5–5.3)
RBC # BLD: 4.61 M/UL — SIGNIFICANT CHANGE UP (ref 4.2–5.8)
RBC # FLD: 16.4 % — HIGH (ref 10.3–14.5)
SODIUM SERPL-SCNC: 136 MMOL/L — SIGNIFICANT CHANGE UP (ref 135–145)
WBC # BLD: 12.68 K/UL — HIGH (ref 3.8–10.5)
WBC # FLD AUTO: 12.68 K/UL — HIGH (ref 3.8–10.5)

## 2023-01-31 PROCEDURE — 99232 SBSQ HOSP IP/OBS MODERATE 35: CPT

## 2023-01-31 PROCEDURE — 99233 SBSQ HOSP IP/OBS HIGH 50: CPT

## 2023-01-31 RX ORDER — INSULIN GLARGINE 100 [IU]/ML
12 INJECTION, SOLUTION SUBCUTANEOUS AT BEDTIME
Refills: 0 | Status: DISCONTINUED | OUTPATIENT
Start: 2023-01-31 | End: 2023-02-02

## 2023-01-31 RX ORDER — INSULIN LISPRO 100/ML
7 VIAL (ML) SUBCUTANEOUS
Refills: 0 | Status: DISCONTINUED | OUTPATIENT
Start: 2023-01-31 | End: 2023-02-02

## 2023-01-31 RX ADMIN — Medication 81 MILLIGRAM(S): at 12:03

## 2023-01-31 RX ADMIN — HEPARIN SODIUM 5000 UNIT(S): 5000 INJECTION INTRAVENOUS; SUBCUTANEOUS at 21:40

## 2023-01-31 RX ADMIN — Medication 8: at 16:30

## 2023-01-31 RX ADMIN — PANTOPRAZOLE SODIUM 40 MILLIGRAM(S): 20 TABLET, DELAYED RELEASE ORAL at 06:28

## 2023-01-31 RX ADMIN — LOSARTAN POTASSIUM 25 MILLIGRAM(S): 100 TABLET, FILM COATED ORAL at 05:22

## 2023-01-31 RX ADMIN — Medication 7 UNIT(S): at 16:30

## 2023-01-31 RX ADMIN — HEPARIN SODIUM 5000 UNIT(S): 5000 INJECTION INTRAVENOUS; SUBCUTANEOUS at 13:15

## 2023-01-31 RX ADMIN — Medication 40 MILLIGRAM(S): at 05:22

## 2023-01-31 RX ADMIN — Medication 4 UNIT(S): at 07:51

## 2023-01-31 RX ADMIN — INSULIN GLARGINE 12 UNIT(S): 100 INJECTION, SOLUTION SUBCUTANEOUS at 21:39

## 2023-01-31 RX ADMIN — HEPARIN SODIUM 5000 UNIT(S): 5000 INJECTION INTRAVENOUS; SUBCUTANEOUS at 05:22

## 2023-01-31 RX ADMIN — TRAMADOL HYDROCHLORIDE 25 MILLIGRAM(S): 50 TABLET ORAL at 13:54

## 2023-01-31 RX ADMIN — Medication 4 UNIT(S): at 12:05

## 2023-01-31 RX ADMIN — Medication 4: at 12:04

## 2023-01-31 RX ADMIN — Medication 10 MILLIGRAM(S): at 23:30

## 2023-01-31 RX ADMIN — ATORVASTATIN CALCIUM 20 MILLIGRAM(S): 80 TABLET, FILM COATED ORAL at 21:40

## 2023-01-31 RX ADMIN — TRAMADOL HYDROCHLORIDE 25 MILLIGRAM(S): 50 TABLET ORAL at 14:50

## 2023-01-31 NOTE — PROGRESS NOTE ADULT - ASSESSMENT
67y Male with hydrocephalus noted on prior imaging, clinical symptoms of cognitive decline, gait difficulty, and incontinence, and episodes of syncope.     Suspected hydrocephalus and elevated CSF pressure.  No hydrocephalus on current MRI  No sign of blockage of CSF flow.   Normal  CSF flow study   Unclear why ICP was elevated, but there is no clear central reason for this ? due to positioning of patient or needle during LP  I spoke with Dr Castle who was unable to repeat LP due to physical barriers- he said he barely was able to get the needle into the canal for the cisternogram.  He is unable to repeat LP at this time   Mobilize with physical therapy.   He will need inpatient rehab on discharge  Will consider outpatient neuropsychology eval for memory testing and a Jose scan for Parkinson's disease when he follows up in office after discharge  I spoke with him today to reinforce the importance of rehab post discharge to assist with his gait    Syncope  He reports that work up in the past was negative.  EEG negative.  loop recorder implant.    Swelling and erythema of feet may have been secondary to gout.     OK from neuro for discharge to rehab with outpatient follow up;  Case discussed with Dr Delgadillo.    will follow with you    Cheng Cedeno MD PhD   [id number]

## 2023-01-31 NOTE — PROGRESS NOTE ADULT - ASSESSMENT
67 yr old male with CKD, diabetes mellitus, gout, CHF, CKD presented after a syncopal episode at home. Patient also with a history of hydrocephalus, follows neurology as outpatient and recent outpatient work up not suggestive of NPH. On admission, he was noted to be in fluid overload, ECHO was ordered. Cardiology, neurology and nephrology consultations were requested. IV Lasix was initiated. ECHO revealed EF 60%. Fluid status improved. MRI brain and spine was ordered, no acute stroke noted on MR brain, chronic degenerative changes noted on MR spine. EEG was done, negative for seizure activity. Loop recorder was offered, patient declined. Patient reported falls and untable gait as an outpatient. Neurology ordered MRI head with CSF flow to further evaluate. Course complicated by acute gout, rheumatology was consulted, advised steroids. MR with CSF flow was done, normal CSF flow through the third ventricle, basal cisterns, cerebral aqueduct, fourth ventricle, and foramen magnum. Patient and family later agreed to proceed with loop recorder placement. IV steroids followed by Prednisone taper was initiated for gout by rheumatology. PT evaluation was done, advised JJ. CT pelvis was done given recent fall, revealed Left total hip arthroplasty without CT evidence of acute hardware complication. Hematoma arising in the subcutaneous fat overlying the right hip measuring 54 x 40 x 88 mm. Hb remained stable. Insulin regimen was adjusted for hyperglycemia.       1. Syncope:  Continue telemetry  cardiology and neurology eval appreciated  MRI brain with CSF flow done, normal study.  s/p loop recorder placement  needs outpatient follow up for work up for Parkinsons and neuropsychology follow up.  no plan for repeat LP.  needs rehab, reports of all scans and MRIs discussed with patient and family.    2. Gout:  continue Prednisone taper and pain control  outpatient rheumatology follow up.    3. Acute diastolic CHF:  improving  PO Lasix.  monitor renal function  continue statin, aspirin, Losartan.    4. CKD:  Monitor renal function  nephrology following.    5. Diabetes mellitus:  now uncontrolled given Prednisone.  Increase Lantus 12 units and pre meal 7 units, continue sliding scale coverage.     6. DVT ppx:  resume Heparin.     7. Hematoma:  likely from recent fall.  Hb stable  no fracture on CT.    Discussed with patient.  updated wife, Fidelia and son at bedside.  Discharge planning.

## 2023-02-01 LAB
ANION GAP SERPL CALC-SCNC: 13 MMOL/L — SIGNIFICANT CHANGE UP (ref 5–17)
BASOPHILS # BLD AUTO: 0.02 K/UL — SIGNIFICANT CHANGE UP (ref 0–0.2)
BASOPHILS NFR BLD AUTO: 0.2 % — SIGNIFICANT CHANGE UP (ref 0–2)
BUN SERPL-MCNC: 56.3 MG/DL — HIGH (ref 8–20)
CALCIUM SERPL-MCNC: 8.8 MG/DL — SIGNIFICANT CHANGE UP (ref 8.4–10.5)
CHLORIDE SERPL-SCNC: 93 MMOL/L — LOW (ref 96–108)
CO2 SERPL-SCNC: 29 MMOL/L — SIGNIFICANT CHANGE UP (ref 22–29)
CREAT SERPL-MCNC: 1.34 MG/DL — HIGH (ref 0.5–1.3)
EGFR: 59 ML/MIN/1.73M2 — LOW
EOSINOPHIL # BLD AUTO: 0.03 K/UL — SIGNIFICANT CHANGE UP (ref 0–0.5)
EOSINOPHIL NFR BLD AUTO: 0.2 % — SIGNIFICANT CHANGE UP (ref 0–6)
GLUCOSE BLDC GLUCOMTR-MCNC: 168 MG/DL — HIGH (ref 70–99)
GLUCOSE BLDC GLUCOMTR-MCNC: 171 MG/DL — HIGH (ref 70–99)
GLUCOSE BLDC GLUCOMTR-MCNC: 174 MG/DL — HIGH (ref 70–99)
GLUCOSE BLDC GLUCOMTR-MCNC: 81 MG/DL — SIGNIFICANT CHANGE UP (ref 70–99)
GLUCOSE SERPL-MCNC: 88 MG/DL — SIGNIFICANT CHANGE UP (ref 70–99)
HCT VFR BLD CALC: 36.5 % — LOW (ref 39–50)
HGB BLD-MCNC: 11.4 G/DL — LOW (ref 13–17)
IMM GRANULOCYTES NFR BLD AUTO: 0.6 % — SIGNIFICANT CHANGE UP (ref 0–0.9)
LYMPHOCYTES # BLD AUTO: 17.5 % — SIGNIFICANT CHANGE UP (ref 13–44)
LYMPHOCYTES # BLD AUTO: 2.17 K/UL — SIGNIFICANT CHANGE UP (ref 1–3.3)
MAGNESIUM SERPL-MCNC: 2.3 MG/DL — SIGNIFICANT CHANGE UP (ref 1.8–2.6)
MCHC RBC-ENTMCNC: 24.8 PG — LOW (ref 27–34)
MCHC RBC-ENTMCNC: 31.2 GM/DL — LOW (ref 32–36)
MCV RBC AUTO: 79.3 FL — LOW (ref 80–100)
MONOCYTES # BLD AUTO: 1.36 K/UL — HIGH (ref 0–0.9)
MONOCYTES NFR BLD AUTO: 11 % — SIGNIFICANT CHANGE UP (ref 2–14)
NEUTROPHILS # BLD AUTO: 8.72 K/UL — HIGH (ref 1.8–7.4)
NEUTROPHILS NFR BLD AUTO: 70.5 % — SIGNIFICANT CHANGE UP (ref 43–77)
PHOSPHATE SERPL-MCNC: 3.8 MG/DL — SIGNIFICANT CHANGE UP (ref 2.4–4.7)
PLATELET # BLD AUTO: 282 K/UL — SIGNIFICANT CHANGE UP (ref 150–400)
POTASSIUM SERPL-MCNC: 3.7 MMOL/L — SIGNIFICANT CHANGE UP (ref 3.5–5.3)
POTASSIUM SERPL-SCNC: 3.7 MMOL/L — SIGNIFICANT CHANGE UP (ref 3.5–5.3)
RBC # BLD: 4.6 M/UL — SIGNIFICANT CHANGE UP (ref 4.2–5.8)
RBC # FLD: 16.3 % — HIGH (ref 10.3–14.5)
SODIUM SERPL-SCNC: 135 MMOL/L — SIGNIFICANT CHANGE UP (ref 135–145)
WBC # BLD: 12.37 K/UL — HIGH (ref 3.8–10.5)
WBC # FLD AUTO: 12.37 K/UL — HIGH (ref 3.8–10.5)

## 2023-02-01 PROCEDURE — 99233 SBSQ HOSP IP/OBS HIGH 50: CPT

## 2023-02-01 RX ADMIN — TRAMADOL HYDROCHLORIDE 25 MILLIGRAM(S): 50 TABLET ORAL at 11:15

## 2023-02-01 RX ADMIN — HEPARIN SODIUM 5000 UNIT(S): 5000 INJECTION INTRAVENOUS; SUBCUTANEOUS at 06:10

## 2023-02-01 RX ADMIN — Medication 81 MILLIGRAM(S): at 11:02

## 2023-02-01 RX ADMIN — Medication 40 MILLIGRAM(S): at 06:11

## 2023-02-01 RX ADMIN — Medication 40 MILLIGRAM(S): at 06:10

## 2023-02-01 RX ADMIN — Medication 7 UNIT(S): at 16:29

## 2023-02-01 RX ADMIN — LOSARTAN POTASSIUM 25 MILLIGRAM(S): 100 TABLET, FILM COATED ORAL at 06:10

## 2023-02-01 RX ADMIN — HEPARIN SODIUM 5000 UNIT(S): 5000 INJECTION INTRAVENOUS; SUBCUTANEOUS at 13:50

## 2023-02-01 RX ADMIN — Medication 2: at 11:06

## 2023-02-01 RX ADMIN — Medication 2: at 16:29

## 2023-02-01 RX ADMIN — Medication 7 UNIT(S): at 11:07

## 2023-02-01 RX ADMIN — PANTOPRAZOLE SODIUM 40 MILLIGRAM(S): 20 TABLET, DELAYED RELEASE ORAL at 06:10

## 2023-02-01 RX ADMIN — TRAMADOL HYDROCHLORIDE 25 MILLIGRAM(S): 50 TABLET ORAL at 12:15

## 2023-02-01 NOTE — PROGRESS NOTE ADULT - ASSESSMENT
67 yr old male with CKD, diabetes mellitus, gout, CHF, CKD presented after a syncopal episode at home. Patient also with a history of hydrocephalus, follows neurology as outpatient and recent outpatient work up not suggestive of NPH. On admission, he was noted to be in fluid overload, ECHO was ordered. Cardiology, neurology and nephrology consultations were requested. IV Lasix was initiated. ECHO revealed EF 60%. Fluid status improved. MRI brain and spine was ordered, no acute stroke noted on MR brain, chronic degenerative changes noted on MR spine. EEG was done, negative for seizure activity. Loop recorder was offered, patient declined. Patient reported falls and untable gait as an outpatient. Neurology ordered MRI head with CSF flow to further evaluate. Course complicated by acute gout, rheumatology was consulted, advised steroids. MR with CSF flow was done, normal CSF flow through the third ventricle, basal cisterns, cerebral aqueduct, fourth ventricle, and foramen magnum. Patient and family later agreed to proceed with loop recorder placement. IV steroids followed by Prednisone taper was initiated for gout by rheumatology. PT evaluation was done, advised JJ. CT pelvis was done given recent fall, revealed Left total hip arthroplasty without CT evidence of acute hardware complication. Hematoma arising in the subcutaneous fat overlying the right hip measuring 54 x 40 x 88 mm. Hb remained stable. Insulin regimen was adjusted for hyperglycemia.       1. Syncope:  Continue telemetry  cardiology and neurology eval appreciated  MRI brain with CSF flow done, normal study.  s/p loop recorder placement  needs outpatient follow up for work up for Parkinsons and neuropsychology follow up.  no plan for repeat LP.      2. Gout:  continue Prednisone taper and pain control  outpatient rheumatology follow up.    3. Acute diastolic CHF:  improving  PO Lasix.  monitor renal function  continue statin, aspirin, Losartan.    4. CKD:  Monitor renal function  nephrology following.    5. Diabetes mellitus:  now uncontrolled given Prednisone.  Change Lantus 9 units and continue pre meal 7 units, continue sliding scale coverage.     6. DVT ppx:  resume Heparin.     7. Hematoma:  likely from recent fall.  Hb stable  no fracture on CT.    Discussed with patient.  Discharge planning.

## 2023-02-02 LAB
GLUCOSE BLDC GLUCOMTR-MCNC: 138 MG/DL — HIGH (ref 70–99)
GLUCOSE BLDC GLUCOMTR-MCNC: 157 MG/DL — HIGH (ref 70–99)
GLUCOSE BLDC GLUCOMTR-MCNC: 171 MG/DL — HIGH (ref 70–99)
GLUCOSE BLDC GLUCOMTR-MCNC: 264 MG/DL — HIGH (ref 70–99)
SARS-COV-2 RNA SPEC QL NAA+PROBE: SIGNIFICANT CHANGE UP

## 2023-02-02 PROCEDURE — 99232 SBSQ HOSP IP/OBS MODERATE 35: CPT

## 2023-02-02 RX ORDER — INSULIN LISPRO 100/ML
5 VIAL (ML) SUBCUTANEOUS
Refills: 0 | Status: DISCONTINUED | OUTPATIENT
Start: 2023-02-02 | End: 2023-02-03

## 2023-02-02 RX ORDER — INSULIN GLARGINE 100 [IU]/ML
9 INJECTION, SOLUTION SUBCUTANEOUS AT BEDTIME
Refills: 0 | Status: DISCONTINUED | OUTPATIENT
Start: 2023-02-02 | End: 2023-02-03

## 2023-02-02 RX ADMIN — PANTOPRAZOLE SODIUM 40 MILLIGRAM(S): 20 TABLET, DELAYED RELEASE ORAL at 06:08

## 2023-02-02 RX ADMIN — Medication 40 MILLIGRAM(S): at 06:07

## 2023-02-02 RX ADMIN — Medication 5 UNIT(S): at 16:33

## 2023-02-02 RX ADMIN — ATORVASTATIN CALCIUM 20 MILLIGRAM(S): 80 TABLET, FILM COATED ORAL at 22:00

## 2023-02-02 RX ADMIN — HEPARIN SODIUM 5000 UNIT(S): 5000 INJECTION INTRAVENOUS; SUBCUTANEOUS at 13:36

## 2023-02-02 RX ADMIN — INSULIN GLARGINE 12 UNIT(S): 100 INJECTION, SOLUTION SUBCUTANEOUS at 00:00

## 2023-02-02 RX ADMIN — ATORVASTATIN CALCIUM 20 MILLIGRAM(S): 80 TABLET, FILM COATED ORAL at 00:00

## 2023-02-02 RX ADMIN — HEPARIN SODIUM 5000 UNIT(S): 5000 INJECTION INTRAVENOUS; SUBCUTANEOUS at 00:00

## 2023-02-02 RX ADMIN — HEPARIN SODIUM 5000 UNIT(S): 5000 INJECTION INTRAVENOUS; SUBCUTANEOUS at 22:00

## 2023-02-02 RX ADMIN — Medication 6: at 16:33

## 2023-02-02 RX ADMIN — Medication 7 UNIT(S): at 07:56

## 2023-02-02 RX ADMIN — Medication 81 MILLIGRAM(S): at 07:57

## 2023-02-02 RX ADMIN — Medication 7 UNIT(S): at 12:40

## 2023-02-02 RX ADMIN — INSULIN GLARGINE 9 UNIT(S): 100 INJECTION, SOLUTION SUBCUTANEOUS at 22:00

## 2023-02-02 RX ADMIN — HEPARIN SODIUM 5000 UNIT(S): 5000 INJECTION INTRAVENOUS; SUBCUTANEOUS at 06:08

## 2023-02-02 RX ADMIN — Medication 2: at 12:40

## 2023-02-02 RX ADMIN — Medication 20 MILLIGRAM(S): at 06:07

## 2023-02-02 NOTE — DIETITIAN INITIAL EVALUATION ADULT - NSICDXPASTMEDICALHX_GEN_ALL_CORE_FT
PAST MEDICAL HISTORY:  Chronic kidney disease     DM (diabetes mellitus)     Gout     Hyperlipidemia

## 2023-02-02 NOTE — DIETITIAN INITIAL EVALUATION ADULT - ORAL INTAKE PTA/DIET HISTORY
Pt unavailable for interview, per staff and documentation with good PO intake completing >75% of meals. Noted with decreased PO intake documented s/p loop recorder (1/30), will continue to monitor.
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
## 2023-02-02 NOTE — DIETITIAN INITIAL EVALUATION ADULT - NS FNS DIET ORDER
Diet, DASH/TLC:   Sodium & Cholesterol Restricted  Consistent Carbohydrate {Evening Snack} (CSTCHOSN) (01-26-23 @ 00:07)

## 2023-02-02 NOTE — DIETITIAN INITIAL EVALUATION ADULT - PERTINENT MEDS FT
MEDICATIONS  (STANDING):  aspirin enteric coated 81 milliGRAM(s) Oral daily  atorvastatin 20 milliGRAM(s) Oral at bedtime  dextrose 5%. 1000 milliLiter(s) (50 mL/Hr) IV Continuous <Continuous>  dextrose 5%. 1000 milliLiter(s) (100 mL/Hr) IV Continuous <Continuous>  dextrose 50% Injectable 25 Gram(s) IV Push once  dextrose 50% Injectable 12.5 Gram(s) IV Push once  dextrose 50% Injectable 25 Gram(s) IV Push once  febuxostat 80 milliGRAM(s) 80 milliGRAM(s) Oral daily  furosemide    Tablet 40 milliGRAM(s) Oral daily  glucagon  Injectable 1 milliGRAM(s) IntraMuscular once  heparin   Injectable 5000 Unit(s) SubCutaneous every 8 hours  insulin glargine Injectable (LANTUS) 9 Unit(s) SubCutaneous at bedtime  insulin lispro (ADMELOG) corrective regimen sliding scale   SubCutaneous at bedtime  insulin lispro (ADMELOG) corrective regimen sliding scale   SubCutaneous three times a day before meals  insulin lispro Injectable (ADMELOG) 5 Unit(s) SubCutaneous three times a day before meals  losartan 25 milliGRAM(s) Oral daily  pantoprazole    Tablet 40 milliGRAM(s) Oral before breakfast  predniSONE   Tablet 20 milliGRAM(s) Oral daily    MEDICATIONS  (PRN):  dextrose Oral Gel 15 Gram(s) Oral once PRN Blood Glucose LESS THAN 70 milliGRAM(s)/deciliter  senna 2 Tablet(s) Oral at bedtime PRN Constipation  traMADol 25 milliGRAM(s) Oral three times a day PRN Moderate Pain (4 - 6)
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
## 2023-02-02 NOTE — DIETITIAN INITIAL EVALUATION ADULT - ENTER TO (CAL/KG)
Message  Peds RT work or school and Other:   Godwin Montejo is under my professional care  She was seen in my office on 10/3/2017     She is able to return to school on 10/4/2017    SHASTA Wood  Signatures   Electronically signed by :  Aristeo Alcaraz MD; Oct  3 2017 11:53PM EST                       (Author)
30

## 2023-02-02 NOTE — DIETITIAN INITIAL EVALUATION ADULT - PERTINENT LABORATORY DATA
02-01    135  |  93<L>  |  56.3<H>  ----------------------------<  88  3.7   |  29.0  |  1.34<H>    Ca    8.8      01 Feb 2023 05:07  Phos  3.8     02-01  Mg     2.3     02-01    POCT Blood Glucose.: 171 mg/dL (02-02-23 @ 12:24)  A1C with Estimated Average Glucose Result: 6.7 % (01-26-23 @ 03:18)

## 2023-02-02 NOTE — PROGRESS NOTE ADULT - ASSESSMENT
67 yr old male with CKD, diabetes mellitus, gout, CHF, CKD presented after a syncopal episode at home. Patient also with a history of hydrocephalus, follows neurology as outpatient and recent outpatient work up not suggestive of NPH. On admission, he was noted to be in fluid overload, ECHO was ordered. Cardiology, neurology and nephrology consultations were requested. IV Lasix was initiated. ECHO revealed EF 60%. Fluid status improved. MRI brain and spine was ordered, no acute stroke noted on MR brain, chronic degenerative changes noted on MR spine. EEG was done, negative for seizure activity. Loop recorder was offered, patient declined. Patient reported falls and untable gait as an outpatient. Neurology ordered MRI head with CSF flow to further evaluate. Course complicated by acute gout, rheumatology was consulted, advised steroids. MR with CSF flow was done, normal CSF flow through the third ventricle, basal cisterns, cerebral aqueduct, fourth ventricle, and foramen magnum. Patient and family later agreed to proceed with loop recorder placement. IV steroids followed by Prednisone taper was initiated for gout by rheumatology. PT evaluation was done, advised [facility]. CT pelvis was done given recent fall, revealed Left total hip arthroplasty without CT evidence of acute hardware complication. Hematoma arising in the subcutaneous fat overlying the right hip measuring 54 x 40 x 88 mm. Hb remained stable. Insulin regimen was adjusted for hyperglycemia.       1. Syncope:  Continue telemetry  cardiology and neurology eval appreciated  MRI brain with CSF flow done, normal study.  s/p loop recorder placement  needs outpatient follow up for work up for Parkinsons and neuropsychology follow up.  no plan for repeat LP.      2. Gout:  continue Prednisone taper and pain control  outpatient rheumatology follow up.    3. Acute diastolic CHF:  improving  PO Lasix.  monitor renal function  continue statin, aspirin, Losartan.    4. CKD:  Monitor renal function  nephrology following.    5. Diabetes mellitus:  now uncontrolled given Prednisone.  Continue Lantus 9 units and continue pre meal 5 units, continue sliding scale coverage.     6. DVT ppx:  resume Heparin.     7. Hematoma:  likely from recent fall.  Hb stable  no fracture on CT.    Discussed with patient.  Discharge planning to [facility] when auth obtained.

## 2023-02-03 ENCOUNTER — TRANSCRIPTION ENCOUNTER (OUTPATIENT)
Age: 68
End: 2023-02-03

## 2023-02-03 VITALS
HEART RATE: 84 BPM | RESPIRATION RATE: 17 BRPM | DIASTOLIC BLOOD PRESSURE: 69 MMHG | TEMPERATURE: 98 F | SYSTOLIC BLOOD PRESSURE: 112 MMHG | OXYGEN SATURATION: 98 %

## 2023-02-03 LAB
ANION GAP SERPL CALC-SCNC: 13 MMOL/L — SIGNIFICANT CHANGE UP (ref 5–17)
BUN SERPL-MCNC: 52 MG/DL — HIGH (ref 8–20)
CALCIUM SERPL-MCNC: 8.6 MG/DL — SIGNIFICANT CHANGE UP (ref 8.4–10.5)
CHLORIDE SERPL-SCNC: 96 MMOL/L — SIGNIFICANT CHANGE UP (ref 96–108)
CO2 SERPL-SCNC: 30 MMOL/L — HIGH (ref 22–29)
CREAT SERPL-MCNC: 1.43 MG/DL — HIGH (ref 0.5–1.3)
EGFR: 54 ML/MIN/1.73M2 — LOW
GLUCOSE BLDC GLUCOMTR-MCNC: 105 MG/DL — HIGH (ref 70–99)
GLUCOSE BLDC GLUCOMTR-MCNC: 198 MG/DL — HIGH (ref 70–99)
GLUCOSE SERPL-MCNC: 79 MG/DL — SIGNIFICANT CHANGE UP (ref 70–99)
POTASSIUM SERPL-MCNC: 3.5 MMOL/L — SIGNIFICANT CHANGE UP (ref 3.5–5.3)
POTASSIUM SERPL-SCNC: 3.5 MMOL/L — SIGNIFICANT CHANGE UP (ref 3.5–5.3)
SODIUM SERPL-SCNC: 139 MMOL/L — SIGNIFICANT CHANGE UP (ref 135–145)

## 2023-02-03 PROCEDURE — 85730 THROMBOPLASTIN TIME PARTIAL: CPT

## 2023-02-03 PROCEDURE — G1004: CPT

## 2023-02-03 PROCEDURE — U0003: CPT

## 2023-02-03 PROCEDURE — 97530 THERAPEUTIC ACTIVITIES: CPT

## 2023-02-03 PROCEDURE — 83880 ASSAY OF NATRIURETIC PEPTIDE: CPT

## 2023-02-03 PROCEDURE — 71045 X-RAY EXAM CHEST 1 VIEW: CPT

## 2023-02-03 PROCEDURE — 93005 ELECTROCARDIOGRAM TRACING: CPT

## 2023-02-03 PROCEDURE — 99239 HOSP IP/OBS DSCHRG MGMT >30: CPT

## 2023-02-03 PROCEDURE — 93970 EXTREMITY STUDY: CPT

## 2023-02-03 PROCEDURE — 84207 ASSAY OF VITAMIN B-6: CPT

## 2023-02-03 PROCEDURE — 72158 MRI LUMBAR SPINE W/O & W/DYE: CPT

## 2023-02-03 PROCEDURE — 70553 MRI BRAIN STEM W/O & W/DYE: CPT

## 2023-02-03 PROCEDURE — 84466 ASSAY OF TRANSFERRIN: CPT

## 2023-02-03 PROCEDURE — 0225U NFCT DS DNA&RNA 21 SARSCOV2: CPT

## 2023-02-03 PROCEDURE — 83735 ASSAY OF MAGNESIUM: CPT

## 2023-02-03 PROCEDURE — 80048 BASIC METABOLIC PNL TOTAL CA: CPT

## 2023-02-03 PROCEDURE — 77003 FLUOROGUIDE FOR SPINE INJECT: CPT

## 2023-02-03 PROCEDURE — 82140 ASSAY OF AMMONIA: CPT

## 2023-02-03 PROCEDURE — 99285 EMERGENCY DEPT VISIT HI MDM: CPT

## 2023-02-03 PROCEDURE — 80061 LIPID PANEL: CPT

## 2023-02-03 PROCEDURE — 93926 LOWER EXTREMITY STUDY: CPT

## 2023-02-03 PROCEDURE — 82607 VITAMIN B-12: CPT

## 2023-02-03 PROCEDURE — 84484 ASSAY OF TROPONIN QUANT: CPT

## 2023-02-03 PROCEDURE — 83036 HEMOGLOBIN GLYCOSYLATED A1C: CPT

## 2023-02-03 PROCEDURE — 95819 EEG AWAKE AND ASLEEP: CPT

## 2023-02-03 PROCEDURE — 36415 COLL VENOUS BLD VENIPUNCTURE: CPT

## 2023-02-03 PROCEDURE — 93880 EXTRACRANIAL BILAT STUDY: CPT

## 2023-02-03 PROCEDURE — 84100 ASSAY OF PHOSPHORUS: CPT

## 2023-02-03 PROCEDURE — 84550 ASSAY OF BLOOD/URIC ACID: CPT

## 2023-02-03 PROCEDURE — C1764: CPT

## 2023-02-03 PROCEDURE — 83550 IRON BINDING TEST: CPT

## 2023-02-03 PROCEDURE — 72192 CT PELVIS W/O DYE: CPT

## 2023-02-03 PROCEDURE — 80053 COMPREHEN METABOLIC PANEL: CPT

## 2023-02-03 PROCEDURE — 70551 MRI BRAIN STEM W/O DYE: CPT

## 2023-02-03 PROCEDURE — 85027 COMPLETE CBC AUTOMATED: CPT

## 2023-02-03 PROCEDURE — 83540 ASSAY OF IRON: CPT

## 2023-02-03 PROCEDURE — 87086 URINE CULTURE/COLONY COUNT: CPT

## 2023-02-03 PROCEDURE — 82728 ASSAY OF FERRITIN: CPT

## 2023-02-03 PROCEDURE — 97116 GAIT TRAINING THERAPY: CPT

## 2023-02-03 PROCEDURE — C8929: CPT

## 2023-02-03 PROCEDURE — 81001 URINALYSIS AUTO W/SCOPE: CPT

## 2023-02-03 PROCEDURE — 86803 HEPATITIS C AB TEST: CPT

## 2023-02-03 PROCEDURE — 33285 INSJ SUBQ CAR RHYTHM MNTR: CPT

## 2023-02-03 PROCEDURE — 97163 PT EVAL HIGH COMPLEX 45 MIN: CPT

## 2023-02-03 PROCEDURE — 85025 COMPLETE CBC W/AUTO DIFF WBC: CPT

## 2023-02-03 PROCEDURE — 72170 X-RAY EXAM OF PELVIS: CPT

## 2023-02-03 PROCEDURE — 70450 CT HEAD/BRAIN W/O DYE: CPT | Mod: MG

## 2023-02-03 PROCEDURE — 72156 MRI NECK SPINE W/O & W/DYE: CPT

## 2023-02-03 PROCEDURE — 82962 GLUCOSE BLOOD TEST: CPT

## 2023-02-03 PROCEDURE — 72157 MRI CHEST SPINE W/O & W/DYE: CPT

## 2023-02-03 PROCEDURE — U0005: CPT

## 2023-02-03 PROCEDURE — 85610 PROTHROMBIN TIME: CPT

## 2023-02-03 PROCEDURE — 97167 OT EVAL HIGH COMPLEX 60 MIN: CPT

## 2023-02-03 PROCEDURE — 73522 X-RAY EXAM HIPS BI 3-4 VIEWS: CPT

## 2023-02-03 RX ORDER — LOSARTAN POTASSIUM 100 MG/1
1 TABLET, FILM COATED ORAL
Qty: 0 | Refills: 0 | DISCHARGE

## 2023-02-03 RX ORDER — LOSARTAN POTASSIUM 100 MG/1
0.5 TABLET, FILM COATED ORAL
Qty: 0 | Refills: 0 | DISCHARGE

## 2023-02-03 RX ORDER — FUROSEMIDE 40 MG
1 TABLET ORAL
Qty: 0 | Refills: 0 | DISCHARGE
Start: 2023-02-03

## 2023-02-03 RX ORDER — SENNA PLUS 8.6 MG/1
2 TABLET ORAL
Qty: 0 | Refills: 0 | DISCHARGE
Start: 2023-02-03

## 2023-02-03 RX ORDER — ASPIRIN/CALCIUM CARB/MAGNESIUM 324 MG
0 TABLET ORAL
Qty: 0 | Refills: 0 | DISCHARGE

## 2023-02-03 RX ORDER — TRAMADOL HYDROCHLORIDE 50 MG/1
0 TABLET ORAL
Qty: 0 | Refills: 0 | DISCHARGE

## 2023-02-03 RX ORDER — MELOXICAM 15 MG/1
1 TABLET ORAL
Qty: 0 | Refills: 0 | DISCHARGE

## 2023-02-03 RX ORDER — FEBUXOSTAT 40 MG/1
1 TABLET ORAL
Qty: 0 | Refills: 0 | DISCHARGE

## 2023-02-03 RX ORDER — EMPAGLIFLOZIN 10 MG/1
1 TABLET, FILM COATED ORAL
Qty: 0 | Refills: 0 | DISCHARGE

## 2023-02-03 RX ORDER — TRAMADOL HYDROCHLORIDE 50 MG/1
50 TABLET ORAL EVERY 8 HOURS
Refills: 0 | Status: DISCONTINUED | OUTPATIENT
Start: 2023-02-03 | End: 2023-02-03

## 2023-02-03 RX ORDER — TRAMADOL HYDROCHLORIDE 50 MG/1
1 TABLET ORAL
Qty: 0 | Refills: 0 | DISCHARGE
Start: 2023-02-03

## 2023-02-03 RX ORDER — METOLAZONE 5 MG/1
1 TABLET ORAL
Qty: 0 | Refills: 0 | DISCHARGE

## 2023-02-03 RX ORDER — ASPIRIN/CALCIUM CARB/MAGNESIUM 324 MG
1 TABLET ORAL
Qty: 0 | Refills: 0 | DISCHARGE

## 2023-02-03 RX ORDER — PANTOPRAZOLE SODIUM 20 MG/1
1 TABLET, DELAYED RELEASE ORAL
Qty: 0 | Refills: 0 | DISCHARGE
Start: 2023-02-03

## 2023-02-03 RX ORDER — POTASSIUM CHLORIDE 20 MEQ
1 PACKET (EA) ORAL
Qty: 0 | Refills: 0 | DISCHARGE

## 2023-02-03 RX ORDER — FUROSEMIDE 40 MG
1 TABLET ORAL
Qty: 0 | Refills: 0 | DISCHARGE

## 2023-02-03 RX ORDER — INSULIN GLARGINE 100 [IU]/ML
9 INJECTION, SOLUTION SUBCUTANEOUS
Qty: 0 | Refills: 0 | DISCHARGE
Start: 2023-02-03

## 2023-02-03 RX ORDER — INSULIN LISPRO 100/ML
4 VIAL (ML) SUBCUTANEOUS
Qty: 0 | Refills: 0 | DISCHARGE
Start: 2023-02-03

## 2023-02-03 RX ADMIN — HEPARIN SODIUM 5000 UNIT(S): 5000 INJECTION INTRAVENOUS; SUBCUTANEOUS at 13:48

## 2023-02-03 RX ADMIN — PANTOPRAZOLE SODIUM 40 MILLIGRAM(S): 20 TABLET, DELAYED RELEASE ORAL at 06:33

## 2023-02-03 RX ADMIN — Medication 20 MILLIGRAM(S): at 06:33

## 2023-02-03 RX ADMIN — HEPARIN SODIUM 5000 UNIT(S): 5000 INJECTION INTRAVENOUS; SUBCUTANEOUS at 06:34

## 2023-02-03 RX ADMIN — LOSARTAN POTASSIUM 25 MILLIGRAM(S): 100 TABLET, FILM COATED ORAL at 06:32

## 2023-02-03 RX ADMIN — Medication 2: at 11:16

## 2023-02-03 RX ADMIN — Medication 5 UNIT(S): at 07:39

## 2023-02-03 RX ADMIN — Medication 5 UNIT(S): at 11:17

## 2023-02-03 RX ADMIN — Medication 81 MILLIGRAM(S): at 07:40

## 2023-02-03 RX ADMIN — Medication 40 MILLIGRAM(S): at 06:33

## 2023-02-03 NOTE — DISCHARGE NOTE PROVIDER - PROVIDER TOKENS
PROVIDER:[TOKEN:[[id number]]],PROVIDER:[TOKEN:[[id number]]],PROVIDER:[TOKEN:[[id number]]]

## 2023-02-03 NOTE — PROGRESS NOTE ADULT - PROVIDER SPECIALTY LIST ADULT
Hospitalist
Nephrology
Nephrology
Neurology
Cardiology
Cardiology
Electrophysiology
Hospitalist
Nephrology
Neurology
Hospitalist
Nephrology

## 2023-02-03 NOTE — DISCHARGE NOTE PROVIDER - CARE PROVIDER_API CALL
Ishan Metcalf (DO)  Internal Medicine; Nephrology  [address], [place] [address]  Phone: [phone]  Fax: [phone]  Follow Up Time:     Maryann James)  Internal Medicine  [address] [place] [id number]  Phone: [phone]  Fax: [phone]  Follow Up Time:     Cheng Cedeno; PhD)  Neurology; Vascular Neurology  [address], [address]  [place] [address]  Phone: [phone]  Fax: [phone]  Follow Up Time:

## 2023-02-03 NOTE — PROGRESS NOTE ADULT - SUBJECTIVE AND OBJECTIVE BOX
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 left hand and right  elbow.    MEDICATIONS  (STANDING):  aspirin enteric coated 81 milliGRAM(s) Oral daily  atorvastatin 20 milliGRAM(s) Oral at bedtime  dextrose 5%. 1000 milliLiter(s) (50 mL/Hr) IV Continuous <Continuous>  dextrose 5%. 1000 milliLiter(s) (100 mL/Hr) IV Continuous <Continuous>  dextrose 50% Injectable 25 Gram(s) IV Push once  dextrose 50% Injectable 12.5 Gram(s) IV Push once  dextrose 50% Injectable 25 Gram(s) IV Push once  febuxostat 40 milliGRAM(s) Oral daily  furosemide   Injectable 40 milliGRAM(s) IV Push two times a day  glucagon  Injectable 1 milliGRAM(s) IntraMuscular once  heparin   Injectable 5000 Unit(s) SubCutaneous every 8 hours  insulin lispro (ADMELOG) corrective regimen sliding scale   SubCutaneous three times a day before meals  insulin lispro (ADMELOG) corrective regimen sliding scale   SubCutaneous at bedtime  losartan 25 milliGRAM(s) Oral daily  potassium chloride   Powder 40 milliEquivalent(s) Oral every 4 hours    MEDICATIONS  (PRN):  dextrose Oral Gel 15 Gram(s) Oral once PRN Blood Glucose LESS THAN 70 milliGRAM(s)/deciliter      Allergies    Erythromycin Base (Other)  Levaquin (Other)  penicillin (Douglass-Earl)          Vital Signs Last 24 Hrs  T(C): 36.6 (2023 04:25), Max: 36.9 (2023 23:57)  T(F): 97.9 (2023 04:25), Max: 98.4 (2023 23:57)  HR: 82 (2023 04:25) (75 - 82)  BP: 108/69 (2023 04:25) (101/66 - 108/69)  BP(mean): --  RR: 19 (2023 04:25) (18 - 19)  SpO2: 96% (2023 04:25) (96% - 97%)    Parameters below as of 2023 04:25  Patient On (Oxygen Delivery Method): room air      PHYSICAL EXAM:    GENERAL: In bed  eating.  HEAD:  wnl  EYES:   ENMT:   NECK: veins  wnl  NERVOUS SYSTEM: alert, oriented   CHEST/LUNG: no 02, no  wheezes  HEART: no rub noted  ABDOMEN: nt  EXTREMITIES:  swollen fingers left , right elbow, erythema  both lower legs  and feet  LYMPH:   SKIN: legs   Merchant with  clear  urine    LABS:                        10.6   7.58  )-----------( 293      ( 2023 05:45 )             34.0         136  |  91<L>  |  31.2<H>  ----------------------------<  124<H>  3.2<L>   |  32.0<H>  |  1.34<H>    Ca    8.7      2023 05:45  Mg     2.3         TPro  7.8  /  Alb  3.7  /  TBili  1.2  /  DBili  x   /  AST  34  /  ALT  19  /  AlkPhos  180<H>      PT/INR - ( 2023 03:18 )   PT: 17.0 sec;   INR: 1.46 ratio         PTT - ( 2023 03:18 )  PTT:30.6 sec  Urinalysis Basic - ( 2023 01:31 )    Color: Yellow / Appearance: Clear / S.010 / pH: x  Gluc: x / Ketone: Negative  / Bili: Negative / Urobili: Negative mg/dL   Blood: x / Protein: 15 / Nitrite: Negative   Leuk Esterase: Negative / RBC: Negative /HPF / WBC Negative /HPF   Sq Epi: x / Non Sq Epi: x / Bacteria: x      Magnesium, Serum: 2.3 mg/dL ( @ 05:45)          RADIOLOGY & ADDITIONAL TESTS:  
NEPHROLOGY INTERVAL HPI/OVERNIGHT EVENTS:  pt clinically unchanged  no acute distress noted    MEDICATIONS  (STANDING):  aspirin enteric coated 81 milliGRAM(s) Oral daily  atorvastatin 20 milliGRAM(s) Oral at bedtime  dextrose 5%. 1000 milliLiter(s) (50 mL/Hr) IV Continuous <Continuous>  dextrose 5%. 1000 milliLiter(s) (100 mL/Hr) IV Continuous <Continuous>  dextrose 50% Injectable 25 Gram(s) IV Push once  dextrose 50% Injectable 12.5 Gram(s) IV Push once  dextrose 50% Injectable 25 Gram(s) IV Push once  febuxostat 80 milliGRAM(s) 80 milliGRAM(s) Oral daily  furosemide    Tablet 40 milliGRAM(s) Oral daily  glucagon  Injectable 1 milliGRAM(s) IntraMuscular once  heparin   Injectable 5000 Unit(s) SubCutaneous every 8 hours  insulin glargine Injectable (LANTUS) 9 Unit(s) SubCutaneous at bedtime  insulin lispro (ADMELOG) corrective regimen sliding scale   SubCutaneous three times a day before meals  insulin lispro (ADMELOG) corrective regimen sliding scale   SubCutaneous at bedtime  insulin lispro Injectable (ADMELOG) 5 Unit(s) SubCutaneous three times a day before meals  losartan 25 milliGRAM(s) Oral daily  pantoprazole    Tablet 40 milliGRAM(s) Oral before breakfast  predniSONE   Tablet 20 milliGRAM(s) Oral daily    MEDICATIONS  (PRN):  dextrose Oral Gel 15 Gram(s) Oral once PRN Blood Glucose LESS THAN 70 milliGRAM(s)/deciliter  senna 2 Tablet(s) Oral at bedtime PRN Constipation  traMADol 25 milliGRAM(s) Oral three times a day PRN Moderate Pain (4 - 6)      Allergies    Erythromycin Base (Other)  Levaquin (Other)  penicillin (Douglass-Earl)        Vital Signs Last 24 Hrs  T(C): 36.4 (03 Feb 2023 04:59), Max: 36.7 (02 Feb 2023 16:37)  T(F): 97.6 (03 Feb 2023 04:59), Max: 98 (02 Feb 2023 16:37)  HR: 81 (03 Feb 2023 04:59) (76 - 81)  BP: 100/71 (03 Feb 2023 04:59) (98/64 - 106/71)  BP(mean): --  RR: 18 (03 Feb 2023 04:59) (18 - 18)  SpO2: 92% (03 Feb 2023 04:59) (90% - 97%)    Parameters below as of 03 Feb 2023 04:59  Patient On (Oxygen Delivery Method): room air        PHYSICAL EXAM:  GENERAL: Fatigued  HEENT: No periorbital edema  NECK: No JVD  NERVOUS SYSTEM:  Alert & Oriented X3  CHEST/LUNG: Clear, diminished BS  HEART: Regular rate and rhythm; no rub  ABDOMEN: Soft, Nontender, +BS  EXTREMITIES: + dependent LE edema    LABS:    02-03    139  |  96  |  52.0<H>  ----------------------------<  79  3.5   |  30.0<H>  |  1.43<H>    Ca    8.6      03 Feb 2023 05:04    `          RADIOLOGY & ADDITIONAL TESTS:  
I was notified that patient refused transport and that the wife wanted to talk to me. I called her back and spoke to her. She voiced concern about her not being called about the procedure, about potential use of anesthesia, about abnormal findings on the echo, and stated that the patient had been in MRI for 4 hours and that the procedure is definitely not happening tonight. Will cancel loop recorder implant. She stated that she wanted to be updated about everything regarding the patient's plan of care moving forward. 
NEPHROLOGY INTERVAL HPI/OVERNIGHT EVENTS:    Chest pain last PM  feels  well now.    MEDICATIONS  (STANDING):  aspirin enteric coated 81 milliGRAM(s) Oral daily  atorvastatin 20 milliGRAM(s) Oral at bedtime  dextrose 5%. 1000 milliLiter(s) (50 mL/Hr) IV Continuous <Continuous>  dextrose 5%. 1000 milliLiter(s) (100 mL/Hr) IV Continuous <Continuous>  dextrose 50% Injectable 25 Gram(s) IV Push once  dextrose 50% Injectable 12.5 Gram(s) IV Push once  dextrose 50% Injectable 25 Gram(s) IV Push once  febuxostat 40 milliGRAM(s) Oral daily  furosemide   Injectable 40 milliGRAM(s) IV Push two times a day  glucagon  Injectable 1 milliGRAM(s) IntraMuscular once  heparin   Injectable 5000 Unit(s) SubCutaneous every 8 hours  insulin lispro (ADMELOG) corrective regimen sliding scale   SubCutaneous three times a day before meals  insulin lispro (ADMELOG) corrective regimen sliding scale   SubCutaneous at bedtime  losartan 25 milliGRAM(s) Oral daily  potassium chloride   Powder 40 milliEquivalent(s) Oral every 4 hours    MEDICATIONS  (PRN):  dextrose Oral Gel 15 Gram(s) Oral once PRN Blood Glucose LESS THAN 70 milliGRAM(s)/deciliter      Allergies    Erythromycin Base (Other)  Levaquin (Other)  penicillin (Douglass-Earl)          Vital Signs Last 24 Hrs  T(C): 36.3 (2023 05:21), Max: 37.1 (2023 21:31)  T(F): 97.3 (2023 05:21), Max: 98.7 (2023 21:31)  HR: 86 (2023 05:21) (80 - 95)  BP: 116/70 (2023 05:21) (103/78 - 135/78)  BP(mean): --  RR: 18 (2023 05:21) (18 - 18)  SpO2: 97% (2023 05:21) (94% - 98%)    Parameters below as of 2023 01:02  Patient On (Oxygen Delivery Method): room air      T(C): 36.7 (2023 04:30), Max: 37.2 (2023 11:51)  T(F): 98 (2023 04:30), Max: 99 (2023 11:51)  HR: 78 (2023 04:30) (78 - 91)  BP: 115/70 (2023 04:30) (112/71 - 115/70)  BP(mean): --  RR: 18 (2023 04:30) (18 - 19)  SpO2: 93% (2023 04:30) (92% - 96%)    Parameters below as of 2023 04:30  Patient On (Oxygen Delivery Method): room air        PHYSICAL EXAM:    GENERAL: Comfortable in bed  HEAD:  wnl  EYES:   ENMT:   NECK: veins  wnl  NERVOUS SYSTEM: alert, oriented   CHEST/LUNG: no 02, no  wheezes  HEART: no rub noted  ABDOMEN: nt  EXTREMITIES: Legs same  LYMPH:   SKIN: legs erythema much  better  with steroids   Neg    LABS:      132<L>  |  91<L>  |  50.0<H>  ----------------------------<  303<H>  4.3   |  29.0  |  1.60<H>    Ca    9.0      2023 04:40  Phos  3.3       Mg     2.5         136  |  91<L>  |  31.2<H>  ----------------------------<  124<H>  3.2<L>   |  32.0<H>  |  1.34<H>    Ca    8.7      2023 05:45  Mg     2.3         TPro  7.8  /  Alb  3.7  /  TBili  1.2  /  DBili  x   /  AST  34  /  ALT  19  /  AlkPhos  180<H>      PT/INR - ( 2023 03:18 )   PT: 17.0 sec;   INR: 1.46 ratio         PTT - ( 2023 03:18 )  PTT:30.6 sec  Urinalysis Basic - ( 2023 01:31 )    Color: Yellow / Appearance: Clear / S.010 / pH: x  Gluc: x / Ketone: Negative  / Bili: Negative / Urobili: Negative mg/dL   Blood: x / Protein: 15 / Nitrite: Negative   Leuk Esterase: Negative / RBC: Negative /HPF / WBC Negative /HPF   Sq Epi: x / Non Sq Epi: x / Bacteria: x      Magnesium, Serum: 2.3 mg/dL ( @ 05:45)          RADIOLOGY & ADDITIONAL TESTS:  
NEPHROLOGY INTERVAL HPI/OVERNIGHT EVENTS:    Feels well eating in bed.    MEDICATIONS  (STANDING):  aspirin enteric coated 81 milliGRAM(s) Oral daily  atorvastatin 20 milliGRAM(s) Oral at bedtime  dextrose 5%. 1000 milliLiter(s) (50 mL/Hr) IV Continuous <Continuous>  dextrose 5%. 1000 milliLiter(s) (100 mL/Hr) IV Continuous <Continuous>  dextrose 50% Injectable 25 Gram(s) IV Push once  dextrose 50% Injectable 12.5 Gram(s) IV Push once  dextrose 50% Injectable 25 Gram(s) IV Push once  febuxostat 40 milliGRAM(s) Oral daily  furosemide   Injectable 40 milliGRAM(s) IV Push two times a day  glucagon  Injectable 1 milliGRAM(s) IntraMuscular once  heparin   Injectable 5000 Unit(s) SubCutaneous every 8 hours  insulin lispro (ADMELOG) corrective regimen sliding scale   SubCutaneous three times a day before meals  insulin lispro (ADMELOG) corrective regimen sliding scale   SubCutaneous at bedtime  losartan 25 milliGRAM(s) Oral daily  potassium chloride   Powder 40 milliEquivalent(s) Oral every 4 hours    MEDICATIONS  (PRN):  dextrose Oral Gel 15 Gram(s) Oral once PRN Blood Glucose LESS THAN 70 milliGRAM(s)/deciliter      Allergies    Erythromycin Base (Other)  Levaquin (Other)  penicillin (Douglass-Earl)          Vital Signs Last 24 Hrs  T(C): 36.6 (2023 17:23), Max: 36.7 (2023 11:15)  T(F): 97.8 (2023 17:23), Max: 98.1 (2023 11:15)  HR: 90 (2023 17:23) (82 - 90)  BP: 118/79 (2023 17:23) (112/71 - 118/79)  BP(mean): --  RR: 19 (2023 17:23) (19 - 19)  SpO2: 96% (2023 17:23) (95% - 96%)    Parameters below as of 2023 17:23  Patient On (Oxygen Delivery Method): room air      T(C): 36.3 (2023 05:21), Max: 37.1 (2023 21:31)  T(F): 97.3 (2023 05:21), Max: 98.7 (2023 21:31)  HR: 86 (2023 05:21) (80 - 95)  BP: 116/70 (2023 05:21) (103/78 - 135/78)  BP(mean): --  RR: 18 (2023 05:21) (18 - 18)  SpO2: 97% (2023 05:21) (94% - 98%)    Parameters below as of 2023 01:02  Patient On (Oxygen Delivery Method): room air        PHYSICAL EXAM:    GENERAL: Comfortable in bed  HEAD:  wnl  EYES:   ENMT:   NECK: veins  wnl  NERVOUS SYSTEM: alert, oriented   CHEST/LUNG: no 02, no  wheezes  HEART: no rub noted, new loop yesterday  ABDOMEN: nt  EXTREMITIES: Legs edema  LYMPH:   SKIN: same   Neg    LABS:        136  |  93<L>  |  51.5<H>  ----------------------------<  133<H>  4.0   |  31.0<H>  |  1.38<H>    Ca    9.2      2023 04:49  Phos  3.4       Mg     2.3         132<L>  |  91<L>  |  50.0<H>  ----------------------------<  303<H>  4.3   |  29.0  |  1.60<H>    Ca    9.0      2023 04:40  Phos  3.3       Mg     2.5         136  |  91<L>  |  31.2<H>  ----------------------------<  124<H>  3.2<L>   |  32.0<H>  |  1.34<H>    Ca    8.7      2023 05:45  Mg     2.3         TPro  7.8  /  Alb  3.7  /  TBili  1.2  /  DBili  x   /  AST  34  /  ALT  19  /  AlkPhos  180<H>      PT/INR - ( 2023 03:18 )   PT: 17.0 sec;   INR: 1.46 ratio         PTT - ( 2023 03:18 )  PTT:30.6 sec  Urinalysis Basic - ( 2023 01:31 )    Color: Yellow / Appearance: Clear / S.010 / pH: x  Gluc: x / Ketone: Negative  / Bili: Negative / Urobili: Negative mg/dL   Blood: x / Protein: 15 / Nitrite: Negative   Leuk Esterase: Negative / RBC: Negative /HPF / WBC Negative /HPF   Sq Epi: x / Non Sq Epi: x / Bacteria: x      Magnesium, Serum: 2.3 mg/dL ( @ 05:45)          RADIOLOGY & ADDITIONAL TESTS:  
                            [facility]                                        Neurology at [place]                                 Pao Judd, & Jese                                  [address]                                        [place], [place], [address]                                             [phone]        CC: Syncope and hydrocephalus. Possible obstructive    HPI:   The patient is a 67y Male with a history of declining cognitive function since November of 2022. He has also had gait difficulty and urinary incontinence.   An MRI at [facility] reportedly showed ventriculomegaly.  He had seen a neurologist in [place] and no cause was found. He saw Dr Cedeno in our office in December of 2022 and nuclear cisternogram was done.   The study did NOT support a diagnosis of Normal Pressure hydrocephalus. The opening pressure when the study was done was > 40.  Further studies were ordered as outpatient, however, the patient presented to the ER.   He reports that on the day of arrival he was in the kitchen getting some juice and the next thing he knew, he was on the floor. He states that this has happened before on more than one occasion.    Wife at bedside today 1/27/23 reports that his gait issues have been going on at least 7 years now.     Interim history:  On 6 Tower.     ROS:   Denies headache or dizziness.  Denies chest pain.  Denies shortness of breath.    MEDICATIONS  (STANDING):  aspirin enteric coated 81 milliGRAM(s) Oral daily  atorvastatin 20 milliGRAM(s) Oral at bedtime  dextrose 5%. 1000 milliLiter(s) (50 mL/Hr) IV Continuous <Continuous>  febuxostat 40 milliGRAM(s) Oral daily  glucagon  Injectable 1 milliGRAM(s) IntraMuscular once  heparin   Injectable 5000 Unit(s) SubCutaneous every 8 hours  insulin lispro (ADMELOG) corrective regimen sliding scale   SubCutaneous three times a day before meals  insulin lispro (ADMELOG) corrective regimen sliding scale   SubCutaneous at bedtime  losartan 25 milliGRAM(s) Oral daily  potassium chloride   Powder 40 milliEquivalent(s) Oral every 4 hours  predniSONE   Tablet 30 milliGRAM(s) Oral daily    Vital Signs Last 24 Hrs  T(C): 36.6 (27 Jan 2023 04:25), Max: 36.9 (26 Jan 2023 23:57)  T(F): 97.9 (27 Jan 2023 04:25), Max: 98.4 (26 Jan 2023 23:57)  HR: 82 (27 Jan 2023 04:25) (75 - 82)  BP: 108/69 (27 Jan 2023 04:25) (101/66 - 108/69)  RR: 19 (27 Jan 2023 04:25) (18 - 19)  SpO2: 96% (27 Jan 2023 04:25) (96% - 97%)    Parameters below as of 27 Jan 2023 04:25  Patient On (Oxygen Delivery Method): room air    Detailed Neurologic Exam:    Mental status: The patient is awake and alert. There is no aphasia. There is no dysarthria.     Cranial nerves: Pupils equal and react symmetrically to light. There is no visual field deficit to threat. Extraocular motion is full with no nystagmus. Facial sensation is intact. Facial musculature is symmetric. Palate elevates symmetrically. Tongue is midline.    Motor: There is normal bulk and tone.  There is no tremor.  Strength grossly 5/5 bilaterally.    Sensation: Dysesthetic to light touch in both feet.     Reflexes: 1+ throughout and plantar responses are flexor.    Cerebellar: No dysmetria on finger nose testing.    Labs:     01-27    136  |  91<L>  |  31.2<H>  ----------------------------<  124<H>  3.2<L>   |  32.0<H>  |  1.34<H>    Ca    8.7      27 Jan 2023 05:45  Mg     2.3     01-27    TPro  7.8  /  Alb  3.7  /  TBili  1.2  /  DBili  x   /  AST  34  /  ALT  19  /  AlkPhos  180<H>  01-25                            10.6   7.58  )-----------( 293      ( 27 Jan 2023 05:45 )             34.0     CSF  WBC: 0  RBC: 128  Protein: 39  Glucose: 76    PCR not detected.       EEG slow but no epileptiform activity.      
INTERVAL HPI/OVERNIGHT EVENTS:     CC:  CHF exacerbation, gout, CKD, syncope, diabetes mellitus      No overnight events  no fracture noted on CT  small hematoma , had recent fall  hand swelling mildly improved      Vital Signs Last 24 Hrs  T(C): 36.7 (30 Jan 2023 11:15), Max: 37.1 (29 Jan 2023 21:31)  T(F): 98.1 (30 Jan 2023 11:15), Max: 98.7 (29 Jan 2023 21:31)  HR: 82 (30 Jan 2023 11:15) (80 - 86)  BP: 112/71 (30 Jan 2023 11:15) (112/71 - 135/78)  BP(mean): --  RR: 19 (30 Jan 2023 11:15) (18 - 19)  SpO2: 95% (30 Jan 2023 11:15) (94% - 98%)    Parameters below as of 30 Jan 2023 11:15  Patient On (Oxygen Delivery Method): room air        PHYSICAL EXAM:    GENERAL: alert, not in distress  CHEST/LUNG: b/l air entry  HEART: reg  ABDOMEN: soft, bs+  EXTREMITIES: 1+ edema, non tender, hand swelling improving.    MEDICATIONS  (STANDING):  aspirin enteric coated 81 milliGRAM(s) Oral daily  atorvastatin 20 milliGRAM(s) Oral at bedtime  clindamycin IVPB 600 milliGRAM(s) IV Intermittent once  dextrose 5%. 1000 milliLiter(s) (100 mL/Hr) IV Continuous <Continuous>  dextrose 5%. 1000 milliLiter(s) (50 mL/Hr) IV Continuous <Continuous>  dextrose 50% Injectable 25 Gram(s) IV Push once  dextrose 50% Injectable 12.5 Gram(s) IV Push once  dextrose 50% Injectable 25 Gram(s) IV Push once  febuxostat 80 milliGRAM(s) 80 milliGRAM(s) Oral daily  furosemide    Tablet 40 milliGRAM(s) Oral daily  glucagon  Injectable 1 milliGRAM(s) IntraMuscular once  insulin lispro (ADMELOG) corrective regimen sliding scale   SubCutaneous three times a day before meals  insulin lispro (ADMELOG) corrective regimen sliding scale   SubCutaneous at bedtime  losartan 25 milliGRAM(s) Oral daily  pantoprazole    Tablet 40 milliGRAM(s) Oral before breakfast  predniSONE   Tablet 40 milliGRAM(s) Oral daily    MEDICATIONS  (PRN):  dextrose Oral Gel 15 Gram(s) Oral once PRN Blood Glucose LESS THAN 70 milliGRAM(s)/deciliter  senna 2 Tablet(s) Oral at bedtime PRN Constipation  traMADol 25 milliGRAM(s) Oral three times a day PRN Moderate Pain (4 - 6)      Allergies    Erythromycin Base (Other)  Levaquin (Other)  penicillin ([place])    Intolerances          LABS:                          11.4   9.45  )-----------( 327      ( 30 Jan 2023 01:45 )             36.2     01-30    132<L>  |  91<L>  |  50.0<H>  ----------------------------<  303<H>  4.3   |  29.0  |  1.60<H>    Ca    9.0      30 Jan 2023 04:40  Phos  3.3     01-30  Mg     2.5     01-30            RADIOLOGY & ADDITIONAL TESTS:  
NEPHROLOGY INTERVAL HPI/OVERNIGHT EVENTS:    No new events.    MEDICATIONS  (STANDING):  aspirin enteric coated 81 milliGRAM(s) Oral daily  atorvastatin 20 milliGRAM(s) Oral at bedtime  dextrose 5%. 1000 milliLiter(s) (50 mL/Hr) IV Continuous <Continuous>  dextrose 5%. 1000 milliLiter(s) (100 mL/Hr) IV Continuous <Continuous>  dextrose 50% Injectable 25 Gram(s) IV Push once  dextrose 50% Injectable 12.5 Gram(s) IV Push once  dextrose 50% Injectable 25 Gram(s) IV Push once  febuxostat 40 milliGRAM(s) Oral daily  furosemide   Injectable 40 milliGRAM(s) IV Push two times a day  glucagon  Injectable 1 milliGRAM(s) IntraMuscular once  heparin   Injectable 5000 Unit(s) SubCutaneous every 8 hours  insulin lispro (ADMELOG) corrective regimen sliding scale   SubCutaneous three times a day before meals  insulin lispro (ADMELOG) corrective regimen sliding scale   SubCutaneous at bedtime  losartan 25 milliGRAM(s) Oral daily  potassium chloride   Powder 40 milliEquivalent(s) Oral every 4 hours    MEDICATIONS  (PRN):  dextrose Oral Gel 15 Gram(s) Oral once PRN Blood Glucose LESS THAN 70 milliGRAM(s)/deciliter      Allergies    Erythromycin Base (Other)  Levaquin (Other)  penicillin (Douglass-Earl)          Vital Signs Last 24 Hrs  T(C): 36.7 (2023 04:30), Max: 37.2 (2023 11:51)  T(F): 98 (2023 04:30), Max: 99 (2023 11:51)  HR: 78 (2023 04:30) (78 - 91)  BP: 115/70 (2023 04:30) (112/71 - 115/70)  BP(mean): --  RR: 18 (2023 04:30) (18 - 19)  SpO2: 93% (2023 04:30) (92% - 96%)    Parameters below as of 2023 04:30  Patient On (Oxygen Delivery Method): room air      T(C): 36.6 (2023 04:25), Max: 36.9 (2023 23:57)  T(F): 97.9 (2023 04:25), Max: 98.4 (2023 23:57)  HR: 82 (2023 04:25) (75 - 82)  BP: 108/69 (2023 04:25) (101/66 - 108/69)  BP(mean): --  RR: 19 (2023 04:25) (18 - 19)  SpO2: 96% (2023 04:25) (96% - 97%)    Parameters below as of 2023 04:25  Patient On (Oxygen Delivery Method): room air      PHYSICAL EXAM:    GENERAL: Comfortable in bed  HEAD:  wnl  EYES:   ENMT:   NECK: veins  wnl  NERVOUS SYSTEM: alert, oriented   CHEST/LUNG: no 02, no  wheezes  HEART: no rub noted  ABDOMEN: nt  EXTREMITIES: Legs same  LYMPH:   SKIN: legs   Neg    LABS:            136  |  91<L>  |  31.2<H>  ----------------------------<  124<H>  3.2<L>   |  32.0<H>  |  1.34<H>    Ca    8.7      2023 05:45  Mg     2.3         TPro  7.8  /  Alb  3.7  /  TBili  1.2  /  DBili  x   /  AST  34  /  ALT  19  /  AlkPhos  180<H>      PT/INR - ( 2023 03:18 )   PT: 17.0 sec;   INR: 1.46 ratio         PTT - ( 2023 03:18 )  PTT:30.6 sec  Urinalysis Basic - ( 2023 01:31 )    Color: Yellow / Appearance: Clear / S.010 / pH: x  Gluc: x / Ketone: Negative  / Bili: Negative / Urobili: Negative mg/dL   Blood: x / Protein: 15 / Nitrite: Negative   Leuk Esterase: Negative / RBC: Negative /HPF / WBC Negative /HPF   Sq Epi: x / Non Sq Epi: x / Bacteria: x      Magnesium, Serum: 2.3 mg/dL ( @ 05:45)          RADIOLOGY & ADDITIONAL TESTS:  
                            [facility]                                        Neurology at [place]                                 Pao Judd, & Jese                                  [address]                                        [place], [place], [address]                                             [phone]        CC: Syncope and hydrocephalus. Possible obstructive    HPI:   The patient is a 67y Male with a history of declining cognitive function since November of 2022. He has also had gait difficulty and urinary incontinence.   An MRI at [facility] reportedly showed ventriculomegaly.  He had seen a neurologist in [place] and no cause was found. He saw Dr Cedeno in our office in December of 2022 and nuclear cisternogram was done.   The study did NOT support a diagnosis of Normal Pressure hydrocephalus. The opening pressure when the study was done was > 40.  Further studies were ordered as outpatient, however, the patient presented to the ER.   He reports that on the day of arrival he was in the kitchen getting some juice and the next thing he knew, he was on the floor. He states that this has happened before on more than one occasion.    Wife at bedside today 1/27/23 reports that his gait issues have been going on at least 7 years now. (JW)    Interim history: no new neuro complaints, awaiting ILR , still with poor gait    ROS:   Denies headache or dizziness.  Denies chest pain.  Denies shortness of breath.    MEDICATIONS  (STANDING):  aspirin enteric coated 81 milliGRAM(s) Oral daily  atorvastatin 20 milliGRAM(s) Oral at bedtime  dextrose 5%. 1000 milliLiter(s) (100 mL/Hr) IV Continuous <Continuous>  dextrose 5%. 1000 milliLiter(s) (50 mL/Hr) IV Continuous <Continuous>  dextrose 50% Injectable 25 Gram(s) IV Push once  dextrose 50% Injectable 12.5 Gram(s) IV Push once  dextrose 50% Injectable 25 Gram(s) IV Push once  febuxostat 80 milliGRAM(s) 80 milliGRAM(s) Oral daily  furosemide    Tablet 40 milliGRAM(s) Oral daily  glucagon  Injectable 1 milliGRAM(s) IntraMuscular once  insulin lispro (ADMELOG) corrective regimen sliding scale   SubCutaneous three times a day before meals  insulin lispro (ADMELOG) corrective regimen sliding scale   SubCutaneous at bedtime  losartan 25 milliGRAM(s) Oral daily  pantoprazole    Tablet 40 milliGRAM(s) Oral before breakfast  predniSONE   Tablet 40 milliGRAM(s) Oral daily    MEDICATIONS  (PRN):  dextrose Oral Gel 15 Gram(s) Oral once PRN Blood Glucose LESS THAN 70 milliGRAM(s)/deciliter  senna 2 Tablet(s) Oral at bedtime PRN Constipation  traMADol 25 milliGRAM(s) Oral three times a day PRN Moderate Pain (4 - 6)      Vital Signs Last 24 Hrs  T(C): 36.7 (30 Jan 2023 11:15), Max: 37.1 (29 Jan 2023 21:31)  T(F): 98.1 (30 Jan 2023 11:15), Max: 98.7 (29 Jan 2023 21:31)  HR: 82 (30 Jan 2023 11:15) (80 - 86)  BP: 112/71 (30 Jan 2023 11:15) (112/71 - 135/78)  BP(mean): --  RR: 19 (30 Jan 2023 11:15) (18 - 19)  SpO2: 95% (30 Jan 2023 11:15) (94% - 98%)    Parameters below as of 30 Jan 2023 11:15  Patient On (Oxygen Delivery Method): room air      Detailed Neurologic Exam:    Mental status: The patient is awake and alert. There is no aphasia. There is no dysarthria.     Cranial nerves: Pupils equal and react symmetrically to light. There is no visual field deficit to threat. Extraocular motion is full with no nystagmus. Facial sensation is intact. Facial musculature is symmetric. Palate elevates symmetrically. Tongue is midline.    Motor: There is normal bulk and tone.  There is no tremor.  Strength grossly symmetric although limited by joint pain and weakness in legs more than arms    Sensation: Dysesthetic to light touch in both feet.     Reflexes: 1+ throughout and plantar responses are flexor.    Cerebellar: No dysmetria on finger nose testing.    Labs:                           11.4   9.45  )-----------( 327      ( 30 Jan 2023 01:45 )             36.2     01-30    132<L>  |  91<L>  |  50.0<H>  ----------------------------<  303<H>  4.3   |  29.0  |  1.60<H>    Ca    9.0      30 Jan 2023 04:40  Phos  3.3     01-30  Mg     2.5     01-30        CSF  WBC: 0  RBC: 128  Protein: 39  Glucose: 76      EEG slow but no epileptiform activity.      Rad:   MRI brain images reviewed: There is no acute pathology. Chronic right basal ganglia lacunar infarct. No enhancement. No hydrocephalus    Spine MRI reviewed.   There is multilevel degenerative change in cervical, thoracic, and lumbar regions with disc bulging at multiple levels.  There is no cord impingement.   There is no enhancement.   No area of CSF block    CSF Flow study- unremarkable  
                            [facility]                                        [facility] [place]                                 [facility], [facility]                                  [address]                                        [place], [place], [address]                                             [phone]        CC: Syncope and hydrocephalus. Possible obstructive    HPI:   The patient is a 67y Male with a history of declining cognitive function since November of 2022. He has also had gait difficulty and urinary incontinence.   An MRI at [facility] reportedly showed ventriculomegaly.  He had seen a neurologist in [place] and no cause was found. He saw Dr Cedeno in our office in December of 2022 and nuclear cisternogram was done.   The study did NOT support a diagnosis of Normal Pressure hydrocephalus. The opening pressure when the study was done was > 40.  Further studies were ordered as outpatient, however, the patient presented to the ER.   He reports that on the day of arrival he was in the kitchen getting some juice and the next thing he knew, he was on the floor. He states that this has happened before on more than one occasion.    Wife at bedside today 1/27/23 reports that his gait issues have been going on at least 7 years now.     Interim history:  On 6 Tower.   No new neurologic issues.    ROS:   Denies headache or dizziness.  Denies chest pain.  Denies shortness of breath.    MEDICATIONS  (STANDING):  aspirin enteric coated 81 milliGRAM(s) Oral daily  atorvastatin 20 milliGRAM(s) Oral at bedtime  dextrose 5%. 1000 milliLiter(s) (50 mL/Hr) IV Continuous <Continuous>  febuxostat 40 milliGRAM(s) Oral daily  glucagon  Injectable 1 milliGRAM(s) IntraMuscular once  heparin   Injectable 5000 Unit(s) SubCutaneous every 8 hours  insulin lispro (ADMELOG) corrective regimen sliding scale   SubCutaneous three times a day before meals  insulin lispro (ADMELOG) corrective regimen sliding scale   SubCutaneous at bedtime  losartan 25 milliGRAM(s) Oral daily  predniSONE   Tablet 30 milliGRAM(s) Oral daily    Vital Signs Last 24 Hrs  T(C): 36.7 (28 Jan 2023 04:30), Max: 37.2 (27 Jan 2023 11:51)  T(F): 98 (28 Jan 2023 04:30), Max: 99 (27 Jan 2023 11:51)  HR: 78 (28 Jan 2023 04:30) (78 - 91)  BP: 115/70 (28 Jan 2023 04:30) (112/71 - 115/70)  RR: 18 (28 Jan 2023 04:30) (18 - 19)  SpO2: 93% (28 Jan 2023 04:30) (92% - 96%)    Parameters below as of 28 Jan 2023 04:30  Patient On (Oxygen Delivery Method): room air    Detailed Neurologic Exam:    Mental status: The patient is awake and alert. There is no aphasia. There is no dysarthria.     Cranial nerves: Pupils equal and react symmetrically to light. There is no visual field deficit to threat. Extraocular motion is full with no nystagmus. Facial sensation is intact. Facial musculature is symmetric. Palate elevates symmetrically. Tongue is midline.    Motor: There is normal bulk and tone.  There is no tremor.  Strength grossly symmetric although limited by joint pain throughout today.    Sensation: Dysesthetic to light touch in both feet.     Reflexes: 1+ throughout and plantar responses are flexor.    Cerebellar: No dysmetria on finger nose testing.  Labs:     01-28    135  |  90<L>  |  39.6<H>  ----------------------------<  141<H>  4.2   |  34.0<H>  |  1.61<H>    Ca    9.4      28 Jan 2023 07:48  Phos  4.2     01-28  Mg     2.3     01-28                              11.0   9.19  )-----------( 297      ( 28 Jan 2023 07:48 )             34.4     CSF  WBC: 0  RBC: 128  Protein: 39  Glucose: 76    PCR not detected.       EEG slow but no epileptiform activity.      Rad:   MRI brain images reviewed: There is no acute pathology. Chronic right basal ganglia lacunar infarct. No enhancement.     Spine MRI reviewed.   There is multilevel degenerative change in cervical, thoracic, and lumbar regions with disc bulging at multiple levels.  There is no cord impingement.   There is no enhancement.       
  Chief Complaint:  syncope     SUBJECTIVE / OVERNIGHT EVENTS:  No acute events reported overnight.  Pt states that his gout started flaring last night.  The joints of b/l hands are red, swollen and he has 8/10 pain.  Pt notes this is typical of his gout flairs.  Patient denies chest pain, SOB, abd pain, N/V.      I&O's Summary        PHYSICAL EXAM:  Vital Signs Last 24 Hrs  T(C): 36.6 (2023 04:25), Max: 36.9 (2023 23:57)  T(F): 97.9 (2023 04:25), Max: 98.4 (2023 23:57)  HR: 82 (2023 04:25) (75 - 82)  BP: 108/69 (2023 04:25) (101/66 - 108/69)  BP(mean): --  RR: 19 (2023 04:25) (18 - 19)  SpO2: 96% (2023 04:25) (96% - 97%)    Parameters below as of 2023 04:25  Patient On (Oxygen Delivery Method): room air      GENERAL: pt examined bedside, laying comfortably in bed in NAD  HEENT: NC/AT, moist oral mucosa, clear conjunctiva, sclera nonicteric  RESPIRATORY: Normal respiratory effort, no wheezing, rhonchi, rales  CARDIOVASCULAR: RRR, normal S1 and S2  ABDOMEN: soft, NT/ND, normoactive bowel sounds, no rebound/guarding  MSK: b/l hand PIPs erythmatous w/ edema and painful to palpation    EXTREMITIES: No cynaosis, no clubbing, 2+ lower extremity edema, pulses are 1+ bilaterally  NEUROLOGY: A+O to person, place, and time, strength 5/5 in all extremities, sensation intact  SKIN: b/l LE stasis dermatitis like skin changes, tinea pedis         LABS:                                           10.6   7.58  )-----------( 293      ( 2023 05:45 )             34.0         136  |  91<L>  |  31.2<H>  ----------------------------<  124<H>  3.2<L>   |  32.0<H>  |  1.34<H>    Ca    8.7      2023 05:45  Mg     2.3         TPro  7.8  /  Alb  3.7  /  TBili  1.2  /  DBili  x   /  AST  34  /  ALT  19  /  AlkPhos  180<H>        Urinalysis Basic - ( 2023 01:31 )    Color: Yellow / Appearance: Clear / S.010 / pH: x  Gluc: x / Ketone: Negative  / Bili: Negative / Urobili: Negative mg/dL   Blood: x / Protein: 15 / Nitrite: Negative   Leuk Esterase: Negative / RBC: Negative /HPF / WBC Negative /HPF   Sq Epi: x / Non Sq Epi: x / Bacteria: x        CAPILLARY BLOOD GLUCOSE      POCT Blood Glucose.: 131 mg/dL (2023 14:39)  POCT Blood Glucose.: 136 mg/dL (2023 10:51)        RADIOLOGY & ADDITIONAL TESTS:    < from: US Duplex Carotid Arteries Complete, Bilateral (23 @ 10:32) >  IMPRESSION: No significant hemodynamic stenosis of either carotid artery.    < end of copied text >      < from: US Duplex Venous Lower Ext Complete, Bilateral (23 @ 05:13) >  IMPRESSION:    Bilateral peroneal veins were poorly visualized. No obvious thrombus in   the visualized bilateral lower extremity deep veins.    < end of copied text >      < from: CT Head No Cont (23 @ 19:24) >  IMPRESSION:  No acute intracranial hemorrhage, mass effect, edema or midline shift.   Mild generalized parenchymal volume loss and chronic microvascular   ischemic changes.    < end of copied text >    < from: Xray Hip 3-4 Views, Bilateral (23 @ 17:31) >  IMPRESSION:  1. No acute fracture or dislocation.  2. Osteoarthritis of theright hip along with relative hypoplasia of the   right acetabulum.  3. Left hip prosthesis intact.  4. Degenerative disc disease, spondylosis and facet arthropathy in the   mid to lower lumbar spine.    < end of copied text >      < from: Xray Chest 1 View- PORTABLE-Urgent (23 @ 17:31) >  IMPRESSION:  1. Cardiomegaly with engorgement of central pulmonary veins but without   pulmonary edema.    < end of copied text >      < from: US Duplex Arterial Lower Ext Ltd, Right (23 @ 17:13) >  IMPRESSION:    No hemodynamically significant right lower extremity arterial stenosis   identified. Correlate with CTA as clinically warranted.    Right lower extremity superficial soft tissue edema. Correlate clinically.    < end of copied text >      < from: NM Cisternogram (23 @ 06:55) >  IMPRESSION:    Findings do not support the diagnosis of NPH.    < end of copied text >      < from: TTE Echo Complete w/ Contrast w/ Doppler (23 @ 09:33) >  Summary:   1. Technically difficult study.   2. Left ventricular ejection fraction, by visual estimation, is 60 to   65%.   3. Normal global left ventricular systolic function.   4. Mildly increased LV wall thickness.   5. Moderately enlarged right ventricle.   6. Moderately reduced RV systolic function.   7. Mildly enlarged rightatrium.   8. Mild ascites.   9. Mild mitral valve regurgitation.  10. Estimated pulmonary artery systolic pressure is 41.6 mmHg assuming a   right atrial pressure of 8 mmHg, which is consistent with mild pulmonary   hypertension.    < end of copied text >      MEDICATIONS  (STANDING):  aspirin enteric coated 81 milliGRAM(s) Oral daily  atorvastatin 20 milliGRAM(s) Oral at bedtime  dextrose 5%. 1000 milliLiter(s) (50 mL/Hr) IV Continuous <Continuous>  dextrose 5%. 1000 milliLiter(s) (100 mL/Hr) IV Continuous <Continuous>  dextrose 50% Injectable 25 Gram(s) IV Push once  dextrose 50% Injectable 12.5 Gram(s) IV Push once  dextrose 50% Injectable 25 Gram(s) IV Push once  febuxostat 40 milliGRAM(s) Oral daily  furosemide   Injectable 40 milliGRAM(s) IV Push two times a day  glucagon  Injectable 1 milliGRAM(s) IntraMuscular once  heparin   Injectable 5000 Unit(s) SubCutaneous every 8 hours  insulin lispro (ADMELOG) corrective regimen sliding scale   SubCutaneous three times a day before meals  insulin lispro (ADMELOG) corrective regimen sliding scale   SubCutaneous at bedtime  losartan 25 milliGRAM(s) Oral daily    MEDICATIONS  (PRN):  dextrose Oral Gel 15 Gram(s) Oral once PRN Blood Glucose LESS THAN 70 milliGRAM(s)/deciliter                                  
INTERVAL HPI/OVERNIGHT EVENTS:    CC:  CHF exacerbation, gout, CKD, syncope, diabetes mellitus    Had MRI brain this am  no new complaints    Vital Signs Last 24 Hrs  T(C): 36.8 (29 Jan 2023 10:27), Max: 36.9 (28 Jan 2023 16:06)  T(F): 98.3 (29 Jan 2023 10:27), Max: 98.4 (28 Jan 2023 16:06)  HR: 95 (29 Jan 2023 10:27) (75 - 95)  BP: 103/78 (29 Jan 2023 10:27) (103/78 - 110/67)  BP(mean): --  RR: 18 (29 Jan 2023 10:27) (18 - 20)  SpO2: 98% (29 Jan 2023 10:27) (91% - 98%)    Parameters below as of 29 Jan 2023 10:27  Patient On (Oxygen Delivery Method): room air        PHYSICAL EXAM:    GENERAL: alert, not in distress  CHEST/LUNG: b/l air entry  HEART: reg  ABDOMEN: soft, bs+  EXTREMITIES:  swollen digits, LE edema.    MEDICATIONS  (STANDING):  aspirin enteric coated 81 milliGRAM(s) Oral daily  atorvastatin 20 milliGRAM(s) Oral at bedtime  dextrose 5%. 1000 milliLiter(s) (50 mL/Hr) IV Continuous <Continuous>  dextrose 5%. 1000 milliLiter(s) (100 mL/Hr) IV Continuous <Continuous>  dextrose 50% Injectable 25 Gram(s) IV Push once  dextrose 50% Injectable 12.5 Gram(s) IV Push once  dextrose 50% Injectable 25 Gram(s) IV Push once  febuxostat 40 milliGRAM(s) Oral daily  febuxostat 80 milliGRAM(s) Oral daily  furosemide    Tablet 40 milliGRAM(s) Oral daily  glucagon  Injectable 1 milliGRAM(s) IntraMuscular once  heparin   Injectable 5000 Unit(s) SubCutaneous every 8 hours  insulin lispro (ADMELOG) corrective regimen sliding scale   SubCutaneous three times a day before meals  insulin lispro (ADMELOG) corrective regimen sliding scale   SubCutaneous at bedtime  losartan 25 milliGRAM(s) Oral daily  pantoprazole    Tablet 40 milliGRAM(s) Oral before breakfast    MEDICATIONS  (PRN):  dextrose Oral Gel 15 Gram(s) Oral once PRN Blood Glucose LESS THAN 70 milliGRAM(s)/deciliter      Allergies    Erythromycin Base (Other)  Levaquin (Other)  penicillin (Rafat-Earl)    Intolerances          LABS:                          11.7   10.62 )-----------( 321      ( 29 Jan 2023 08:39 )             37.1     01-29    133<L>  |  91<L>  |  45.5<H>  ----------------------------<  152<H>  4.0   |  30.0<H>  |  1.42<H>    Ca    9.1      29 Jan 2023 08:39  Phos  3.4     01-29  Mg     2.5     01-29            RADIOLOGY & ADDITIONAL TESTS:  
INTERVAL HPI/OVERNIGHT EVENTS:    CC: CHF exacerbation, gout, CKD, syncope, diabetes mellitus    No overnight events  denies complaints.   still with some joint pain, Tramadol dose to be increased, does not want Oxy    Vital Signs Last 24 Hrs  T(C): 36.3 (03 Feb 2023 11:42), Max: 36.7 (02 Feb 2023 16:37)  T(F): 97.3 (03 Feb 2023 11:42), Max: 98 (02 Feb 2023 16:37)  HR: 78 (03 Feb 2023 11:42) (76 - 81)  BP: 99/65 (03 Feb 2023 11:42) (98/64 - 106/71)  BP(mean): --  RR: 18 (03 Feb 2023 11:42) (18 - 18)  SpO2: 98% (03 Feb 2023 11:42) (90% - 98%)    Parameters below as of 03 Feb 2023 11:42  Patient On (Oxygen Delivery Method): room air        PHYSICAL EXAM:    GENERAL: alert, not in distress  CHEST/LUNG: b/l air entry  HEART: reg  ABDOMEN: soft, bs+  EXTREMITIES:  no edema, tenderness    MEDICATIONS  (STANDING):  aspirin enteric coated 81 milliGRAM(s) Oral daily  atorvastatin 20 milliGRAM(s) Oral at bedtime  dextrose 5%. 1000 milliLiter(s) (50 mL/Hr) IV Continuous <Continuous>  dextrose 5%. 1000 milliLiter(s) (100 mL/Hr) IV Continuous <Continuous>  dextrose 50% Injectable 25 Gram(s) IV Push once  dextrose 50% Injectable 12.5 Gram(s) IV Push once  dextrose 50% Injectable 25 Gram(s) IV Push once  febuxostat 80 milliGRAM(s) 80 milliGRAM(s) Oral daily  furosemide    Tablet 40 milliGRAM(s) Oral daily  glucagon  Injectable 1 milliGRAM(s) IntraMuscular once  heparin   Injectable 5000 Unit(s) SubCutaneous every 8 hours  insulin glargine Injectable (LANTUS) 9 Unit(s) SubCutaneous at bedtime  insulin lispro (ADMELOG) corrective regimen sliding scale   SubCutaneous three times a day before meals  insulin lispro (ADMELOG) corrective regimen sliding scale   SubCutaneous at bedtime  insulin lispro Injectable (ADMELOG) 5 Unit(s) SubCutaneous three times a day before meals  losartan 25 milliGRAM(s) Oral daily  pantoprazole    Tablet 40 milliGRAM(s) Oral before breakfast  predniSONE   Tablet 20 milliGRAM(s) Oral daily    MEDICATIONS  (PRN):  dextrose Oral Gel 15 Gram(s) Oral once PRN Blood Glucose LESS THAN 70 milliGRAM(s)/deciliter  senna 2 Tablet(s) Oral at bedtime PRN Constipation  traMADol 50 milliGRAM(s) Oral every 8 hours PRN Severe Pain (7 - 10)      Allergies    Erythromycin Base (Other)  Levaquin (Other)  penicillin (Douglass-Earl)    Intolerances          LABS:      02-03    139  |  96  |  52.0<H>  ----------------------------<  79  3.5   |  30.0<H>  |  1.43<H>    Ca    8.6      03 Feb 2023 05:04            RADIOLOGY & ADDITIONAL TESTS:  
INTERVAL HPI/OVERNIGHT EVENTS:    CC: CHF exacerbation, gout, CKD, syncope, diabetes mellitus    No overnight events  had loop recorder placed yesterday.    Vital Signs Last 24 Hrs  T(C): 36.8 (31 Jan 2023 12:28), Max: 36.8 (31 Jan 2023 12:28)  T(F): 98.2 (31 Jan 2023 12:28), Max: 98.2 (31 Jan 2023 12:28)  HR: 82 (31 Jan 2023 12:28) (82 - 90)  BP: 109/72 (31 Jan 2023 12:28) (109/72 - 118/79)  BP(mean): --  RR: 19 (31 Jan 2023 12:28) (19 - 19)  SpO2: 95% (31 Jan 2023 12:28) (95% - 96%)    Parameters below as of 31 Jan 2023 12:28  Patient On (Oxygen Delivery Method): room air        PHYSICAL EXAM:    GENERAL: alert, not in distress  CHEST/LUNG: b/l air entry  HEART: reg  ABDOMEN: soft, bs+  EXTREMITIES:  no edema, tenderness    MEDICATIONS  (STANDING):  aspirin enteric coated 81 milliGRAM(s) Oral daily  atorvastatin 20 milliGRAM(s) Oral at bedtime  dextrose 5%. 1000 milliLiter(s) (50 mL/Hr) IV Continuous <Continuous>  dextrose 5%. 1000 milliLiter(s) (100 mL/Hr) IV Continuous <Continuous>  dextrose 50% Injectable 25 Gram(s) IV Push once  dextrose 50% Injectable 12.5 Gram(s) IV Push once  dextrose 50% Injectable 25 Gram(s) IV Push once  febuxostat 80 milliGRAM(s) 80 milliGRAM(s) Oral daily  furosemide    Tablet 40 milliGRAM(s) Oral daily  glucagon  Injectable 1 milliGRAM(s) IntraMuscular once  heparin   Injectable 5000 Unit(s) SubCutaneous every 8 hours  insulin glargine Injectable (LANTUS) 8 Unit(s) SubCutaneous at bedtime  insulin lispro (ADMELOG) corrective regimen sliding scale   SubCutaneous three times a day before meals  insulin lispro (ADMELOG) corrective regimen sliding scale   SubCutaneous at bedtime  insulin lispro Injectable (ADMELOG) 4 Unit(s) SubCutaneous three times a day before meals  losartan 25 milliGRAM(s) Oral daily  pantoprazole    Tablet 40 milliGRAM(s) Oral before breakfast  predniSONE   Tablet 40 milliGRAM(s) Oral daily    MEDICATIONS  (PRN):  dextrose Oral Gel 15 Gram(s) Oral once PRN Blood Glucose LESS THAN 70 milliGRAM(s)/deciliter  senna 2 Tablet(s) Oral at bedtime PRN Constipation  traMADol 25 milliGRAM(s) Oral three times a day PRN Moderate Pain (4 - 6)      Allergies    Erythromycin Base (Other)  Levaquin (Other)  penicillin (Douglass-Earl)    Intolerances          LABS:                          11.5   12.68 )-----------( 300      ( 31 Jan 2023 04:49 )             37.0     01-31    136  |  93<L>  |  51.5<H>  ----------------------------<  133<H>  4.0   |  31.0<H>  |  1.38<H>    Ca    9.2      31 Jan 2023 04:49  Phos  3.4     01-31  Mg     2.3     01-31            RADIOLOGY & ADDITIONAL TESTS:  
INTERVAL HPI/OVERNIGHT EVENTS:    CC: CHF exacerbation, gout, CKD, syncope, diabetes mellitus    No overnight events.    Vital Signs Last 24 Hrs  T(C): 36.9 (01 Feb 2023 11:44), Max: 36.9 (01 Feb 2023 11:44)  T(F): 98.4 (01 Feb 2023 11:44), Max: 98.4 (01 Feb 2023 11:44)  HR: 78 (01 Feb 2023 11:44) (78 - 81)  BP: 91/60 (01 Feb 2023 11:44) (91/60 - 108/75)  BP(mean): 80 (01 Feb 2023 00:30) (80 - 81)  RR: 19 (01 Feb 2023 11:44) (18 - 20)  SpO2: 98% (01 Feb 2023 11:44) (95% - 98%)    Parameters below as of 01 Feb 2023 11:44  Patient On (Oxygen Delivery Method): nasal cannula        PHYSICAL EXAM:    GENERAL: alert, not in distress  CHEST/LUNG: b/l air entry  HEART: reg  ABDOMEN: soft, bs+  EXTREMITIES:  1+ edema, non tender    MEDICATIONS  (STANDING):  aspirin enteric coated 81 milliGRAM(s) Oral daily  atorvastatin 20 milliGRAM(s) Oral at bedtime  dextrose 5%. 1000 milliLiter(s) (50 mL/Hr) IV Continuous <Continuous>  dextrose 5%. 1000 milliLiter(s) (100 mL/Hr) IV Continuous <Continuous>  dextrose 50% Injectable 25 Gram(s) IV Push once  dextrose 50% Injectable 12.5 Gram(s) IV Push once  dextrose 50% Injectable 25 Gram(s) IV Push once  febuxostat 80 milliGRAM(s) 80 milliGRAM(s) Oral daily  furosemide    Tablet 40 milliGRAM(s) Oral daily  glucagon  Injectable 1 milliGRAM(s) IntraMuscular once  heparin   Injectable 5000 Unit(s) SubCutaneous every 8 hours  insulin glargine Injectable (LANTUS) 12 Unit(s) SubCutaneous at bedtime  insulin lispro (ADMELOG) corrective regimen sliding scale   SubCutaneous three times a day before meals  insulin lispro (ADMELOG) corrective regimen sliding scale   SubCutaneous at bedtime  insulin lispro Injectable (ADMELOG) 7 Unit(s) SubCutaneous three times a day before meals  losartan 25 milliGRAM(s) Oral daily  pantoprazole    Tablet 40 milliGRAM(s) Oral before breakfast    MEDICATIONS  (PRN):  dextrose Oral Gel 15 Gram(s) Oral once PRN Blood Glucose LESS THAN 70 milliGRAM(s)/deciliter  senna 2 Tablet(s) Oral at bedtime PRN Constipation  traMADol 25 milliGRAM(s) Oral three times a day PRN Moderate Pain (4 - 6)      Allergies    Erythromycin Base (Other)  Levaquin (Other)  penicillin (Douglass-Earl)    Intolerances          LABS:                          11.4   12.37 )-----------( 282      ( 01 Feb 2023 05:07 )             36.5     02-01    135  |  93<L>  |  56.3<H>  ----------------------------<  88  3.7   |  29.0  |  1.34<H>    Ca    8.8      01 Feb 2023 05:07  Phos  3.8     02-01  Mg     2.3     02-01            RADIOLOGY & ADDITIONAL TESTS:  
NEPHROLOGY INTERVAL HPI/OVERNIGHT EVENTS:    Examined  Feeling better  Has jose mercado good UOP  Denies HA CP    MEDICATIONS  (STANDING):  aspirin enteric coated 81 milliGRAM(s) Oral daily  atorvastatin 20 milliGRAM(s) Oral at bedtime  dextrose 5%. 1000 milliLiter(s) (50 mL/Hr) IV Continuous <Continuous>  dextrose 5%. 1000 milliLiter(s) (100 mL/Hr) IV Continuous <Continuous>  dextrose 50% Injectable 25 Gram(s) IV Push once  dextrose 50% Injectable 12.5 Gram(s) IV Push once  dextrose 50% Injectable 25 Gram(s) IV Push once  febuxostat 80 milliGRAM(s) 80 milliGRAM(s) Oral daily  furosemide    Tablet 40 milliGRAM(s) Oral daily  glucagon  Injectable 1 milliGRAM(s) IntraMuscular once  heparin   Injectable 5000 Unit(s) SubCutaneous every 8 hours  insulin glargine Injectable (LANTUS) 9 Unit(s) SubCutaneous at bedtime  insulin lispro (ADMELOG) corrective regimen sliding scale   SubCutaneous at bedtime  insulin lispro (ADMELOG) corrective regimen sliding scale   SubCutaneous three times a day before meals  insulin lispro Injectable (ADMELOG) 5 Unit(s) SubCutaneous three times a day before meals  losartan 25 milliGRAM(s) Oral daily  pantoprazole    Tablet 40 milliGRAM(s) Oral before breakfast  predniSONE   Tablet 20 milliGRAM(s) Oral daily    MEDICATIONS  (PRN):  dextrose Oral Gel 15 Gram(s) Oral once PRN Blood Glucose LESS THAN 70 milliGRAM(s)/deciliter  senna 2 Tablet(s) Oral at bedtime PRN Constipation  traMADol 25 milliGRAM(s) Oral three times a day PRN Moderate Pain (4 - 6)      Allergies    Erythromycin Base (Other)  Levaquin (Other)  penicillin (Douglass-Earl)    Intolerances        Vital Signs Last 24 Hrs  T(C): 36.6 (02 Feb 2023 12:15), Max: 36.8 (01 Feb 2023 16:57)  T(F): 97.8 (02 Feb 2023 12:15), Max: 98.3 (01 Feb 2023 16:57)  HR: 77 (02 Feb 2023 12:15) (77 - 95)  BP: 106/71 (02 Feb 2023 12:15) (104/61 - 109/68)  BP(mean): --  RR: 18 (02 Feb 2023 12:15) (18 - 18)  SpO2: 90% (02 Feb 2023 12:15) (90% - 97%)    Parameters below as of 02 Feb 2023 12:15  Patient On (Oxygen Delivery Method): room air      PHYSICAL EXAM:  GENERAL: NAD  HEAD: NCAT  EYES: EOMI  NECK: Supple  NERVOUS SYSTEM:  Alert & Oriented X3  CHEST/LUNG: Clear to percussion bilaterally  HEART: Regular rate and rhythm  ABDOMEN: Soft, Nontender, Nondistended; +BS  EXTREMITIES: + LE edema    LABS:                        11.4   12.37 )-----------( 282      ( 01 Feb 2023 05:07 )             36.5     02-01    135  |  93<L>  |  56.3<H>  ----------------------------<  88  3.7   |  29.0  |  1.34<H>    Ca    8.8      01 Feb 2023 05:07  Phos  3.8     02-01  Mg     2.3     02-01                  RADIOLOGY & ADDITIONAL TESTS:

## 2023-02-03 NOTE — DISCHARGE NOTE PROVIDER - NSDCMRMEDTOKEN_GEN_ALL_CORE_FT
aspirin 81 mg oral tablet:   atorvastatin 20 mg oral tablet: 1 tab(s) orally once a day  febuxostat 80 mg oral tablet: 1 tab(s) orally once a day  Jardiance 10 mg oral tablet: 1 tab(s) orally once a day (in the morning)  Lasix 80 mg oral tablet: 1 tab(s) orally once a day  losartan 50 mg oral tablet: 1 tab(s) orally once a day  meloxicam 15 mg oral tablet: 1 tab(s) orally once a day  metOLazone 5 mg oral tablet: 1 tab(s) orally 3 times a week  potassium chloride 10 mEq oral capsule, extended release: 1  orally 4 times a day  traMADol 50 mg oral tablet: orally every 6 hours, As Needed

## 2023-02-03 NOTE — DISCHARGE NOTE NURSING/CASE MANAGEMENT/SOCIAL WORK - PATIENT PORTAL LINK FT
You can access the FollowMyHealth Patient Portal offered by [facility] by registering at the following website: [email or web address]. By joining [email or web address]’s FollowMyHealth portal, you will also be able to view your health information using other applications (apps) compatible with our system.

## 2023-02-03 NOTE — PROGRESS NOTE ADULT - REASON FOR ADMISSION
syncope and collapse

## 2023-02-03 NOTE — PROGRESS NOTE ADULT - ASSESSMENT
67 yr old male with CKD, diabetes mellitus, gout, CHF, CKD presented after a syncopal episode at home. Patient also with a history of hydrocephalus, follows neurology as outpatient and recent outpatient work up not suggestive of NPH. On admission, he was noted to be in fluid overload, ECHO was ordered. Cardiology, neurology and nephrology consultations were requested. IV Lasix was initiated. ECHO revealed EF 60%. Fluid status improved. MRI brain and spine was ordered, no acute stroke noted on MR brain, chronic degenerative changes noted on MR spine. EEG was done, negative for seizure activity. Loop recorder was offered, patient declined. Patient reported falls and untable gait as an outpatient. Neurology ordered MRI head with CSF flow to further evaluate. Course complicated by acute gout, rheumatology was consulted, advised steroids. MR with CSF flow was done, normal CSF flow through the third ventricle, basal cisterns, cerebral aqueduct, fourth ventricle, and foramen magnum. Patient and family later agreed to proceed with loop recorder placement. IV steroids followed by Prednisone taper was initiated for gout by rheumatology. PT evaluation was done, advised [facility]. CT pelvis was done given recent fall, revealed Left total hip arthroplasty without CT evidence of acute hardware complication. Hematoma arising in the subcutaneous fat overlying the right hip measuring 54 x 40 x 88 mm. Hb remained stable. Insulin regimen was adjusted for hyperglycemia.       1. Syncope:  Continue telemetry  cardiology and neurology eval appreciated  MRI brain with CSF flow done, normal study.  s/p loop recorder placement  needs outpatient follow up for work up for Parkinsons and neuropsychology follow up.  no plan for repeat LP.      2. Gout:  continue Prednisone taper and pain control  outpatient rheumatology follow up.  Tramadol dose adjusted.    3. Acute diastolic CHF:  improving  PO Lasix.  monitor renal function  continue statin, aspirin, Losartan.    4. CKD:  Monitor renal function  nephrology following.    5. Diabetes mellitus:  now uncontrolled given Prednisone.  Continue Lantus 9 units and continue pre meal 5 units, continue sliding scale coverage.     6. DVT ppx:  continue Heparin.     7. Hematoma:  likely from recent fall.  Hb stable  no fracture on CT.    Discussed with patient.  Stable for discharge to [facility].

## 2023-02-03 NOTE — DISCHARGE NOTE PROVIDER - NSDCCPCAREPLAN_GEN_ALL_CORE_FT
PRINCIPAL DISCHARGE DIAGNOSIS  Diagnosis: Syncope and collapse  Assessment and Plan of Treatment: s/p loop recorder placement  follow up with cardiology and neurology      SECONDARY DISCHARGE DIAGNOSES  Diagnosis: Fluid overload  Assessment and Plan of Treatment:     Diagnosis: Unstable gait  Assessment and Plan of Treatment: Continue JJ  work up from neuro stand point negative so far  Follow up with neuro for further work up for Parkinson's    Diagnosis: Diastolic CHF, chronic  Assessment and Plan of Treatment: improved  On Lasix  cardiology follow up on discharge.    Diagnosis: Stage 3 chronic kidney disease  Assessment and Plan of Treatment: at baseline  continue current regimen  nephrology follow up on discharge.    Diagnosis: Gout  Assessment and Plan of Treatment: Prednisone taper  Tramadol for pain control.

## 2023-02-03 NOTE — DISCHARGE NOTE PROVIDER - CARE PROVIDERS DIRECT ADDRESSES
,DirectAddress_Unknown,DirectAddress_Unknown,[email or web address]

## 2023-02-03 NOTE — DISCHARGE NOTE NURSING/CASE MANAGEMENT/SOCIAL WORK - NSDCPEFALRISK_GEN_ALL_CORE
For information on Fall & Injury Prevention, visit: [email or web address] OR  [email or web address] OR  https://www.cdc.gov/steadi/patient.html

## 2023-02-03 NOTE — PROGRESS NOTE ADULT - ASSESSMENT
CKD(III): +DM, HTN  Vol overload  Elevated BUN also due to steroids effects  - avoid potential nephrotoxins  - cont diuretics  - monitor labs closely; cont ARB for now

## 2023-02-03 NOTE — DISCHARGE NOTE PROVIDER - NSDCFUSCHEDAPPT_GEN_ALL_CORE_FT
Gopi Arteaga  [facility]  RHEUM [address]  Scheduled Appointment: 02/07/2023    [facility]  MRI  [address]  Scheduled Appointment: 02/08/2023    [facility]  MRI  [address]  Scheduled Appointment: 02/08/2023    [facility]  MRI  [address]  Scheduled Appointment: 02/08/2023    [facility]  MRI  [address]  Scheduled Appointment: 02/08/2023    Cheng Cedeno  [facility]  NEUROLOGY [address]  Scheduled Appointment: 03/20/2023

## 2023-02-03 NOTE — DISCHARGE NOTE NURSING/CASE MANAGEMENT/SOCIAL WORK - NSDCFUADDAPPT_GEN_ALL_CORE_FT
D/C TO [facility]  [address] [place] [address] [phone]

## 2023-02-03 NOTE — DISCHARGE NOTE PROVIDER - HOSPITAL COURSE
67 yr old male with CKD, diabetes mellitus, gout, CHF, CKD presented after a syncopal episode at home. Patient also with a history of hydrocephalus, follows neurology as outpatient and recent outpatient work up not suggestive of NPH. On admission, he was noted to be in fluid overload, ECHO was ordered. Cardiology, neurology and nephrology consultations were requested. IV Lasix was initiated. ECHO revealed EF 60%. Fluid status improved. MRI brain and spine was ordered, no acute stroke noted on MR brain, chronic degenerative changes noted on MR spine. EEG was done, negative for seizure activity. Loop recorder was offered, patient declined. Patient reported falls and untable gait as an outpatient. Neurology ordered MRI head with CSF flow to further evaluate. Course complicated by acute gout, rheumatology was consulted, advised steroids. MR with CSF flow was done, normal CSF flow through the third ventricle, basal cisterns, cerebral aqueduct, fourth ventricle, and foramen magnum. Patient and family later agreed to proceed with loop recorder placement. IV steroids followed by Prednisone taper was initiated for gout by rheumatology. PT evaluation was done, advised JJ. CT pelvis was done given recent fall, revealed Left total hip arthroplasty without CT evidence of acute hardware complication. Hematoma arising in the subcutaneous fat overlying the right hip measuring 54 x 40 x 88 mm. Hb remained stable. Insulin regimen was adjusted for hyperglycemia.    67 yr old male with CKD, diabetes mellitus, gout, CHF, CKD presented after a syncopal episode at home. Patient also with a history of hydrocephalus, follows neurology as outpatient and recent outpatient work up not suggestive of NPH. On admission, he was noted to be in fluid overload, ECHO was ordered. Cardiology, neurology and nephrology consultations were requested. IV Lasix was initiated. ECHO revealed EF 60%. Fluid status improved. MRI brain and spine was ordered, no acute stroke noted on MR brain, chronic degenerative changes noted on MR spine. EEG was done, negative for seizure activity. Loop recorder was offered, patient declined. Patient reported falls and untable gait as an outpatient. Neurology ordered MRI head with CSF flow to further evaluate. Course complicated by acute gout, rheumatology was consulted, advised steroids. MR with CSF flow was done, normal CSF flow through the third ventricle, basal cisterns, cerebral aqueduct, fourth ventricle, and foramen magnum. Patient and family later agreed to proceed with loop recorder placement. IV steroids followed by Prednisone taper was initiated for gout by rheumatology. PT evaluation was done, advised [facility]. CT pelvis was done given recent fall, revealed Left total hip arthroplasty without CT evidence of acute hardware complication. Hematoma arising in the subcutaneous fat overlying the right hip measuring 54 x 40 x 88 mm. Hb remained stable. Insulin regimen was adjusted for hyperglycemia. He is medically stable for discharge to [facility].

## 2023-02-07 ENCOUNTER — APPOINTMENT (OUTPATIENT)
Dept: RHEUMATOLOGY | Facility: CLINIC | Age: 68
End: 2023-02-07

## 2023-02-08 ENCOUNTER — APPOINTMENT (OUTPATIENT)
Dept: MRI IMAGING | Facility: CLINIC | Age: 68
End: 2023-02-08

## 2023-02-08 ENCOUNTER — APPOINTMENT (OUTPATIENT)
Dept: RHEUMATOLOGY | Facility: CLINIC | Age: 68
End: 2023-02-08

## 2023-02-08 PROBLEM — E11.9 TYPE 2 DIABETES MELLITUS WITHOUT COMPLICATIONS: Chronic | Status: ACTIVE | Noted: 2023-01-25

## 2023-02-08 PROBLEM — E78.5 HYPERLIPIDEMIA, UNSPECIFIED: Chronic | Status: ACTIVE | Noted: 2023-01-26

## 2023-02-08 PROBLEM — M10.9 GOUT, UNSPECIFIED: Chronic | Status: ACTIVE | Noted: 2023-01-25

## 2023-02-08 PROBLEM — N18.9 CHRONIC KIDNEY DISEASE, UNSPECIFIED: Chronic | Status: ACTIVE | Noted: 2023-01-25

## 2023-03-20 ENCOUNTER — APPOINTMENT (OUTPATIENT)
Dept: NEUROLOGY | Facility: CLINIC | Age: 68
End: 2023-03-20

## 2023-04-17 ENCOUNTER — NON-APPOINTMENT (OUTPATIENT)
Age: 68
End: 2023-04-17

## 2023-04-17 DIAGNOSIS — R80.9 PROTEINURIA, UNSPECIFIED: ICD-10-CM

## 2023-04-17 RX ORDER — LOSARTAN POTASSIUM 25 MG/1
25 TABLET, FILM COATED ORAL DAILY
Refills: 0 | Status: ACTIVE | COMMUNITY

## 2023-04-17 RX ORDER — FUROSEMIDE 40 MG/1
40 TABLET ORAL DAILY
Refills: 0 | Status: ACTIVE | COMMUNITY

## 2023-04-17 RX ORDER — POTASSIUM CHLORIDE 750 MG/1
10 CAPSULE, EXTENDED RELEASE ORAL DAILY
Refills: 0 | Status: ACTIVE | COMMUNITY

## 2023-10-01 ENCOUNTER — EMERGENCY (EMERGENCY)
Facility: HOSPITAL | Age: 68
LOS: 1 days | Discharge: HOME | End: 2023-10-01
Attending: EMERGENCY MEDICINE
Payer: MEDICARE

## 2023-10-01 VITALS
HEIGHT: 69 IN | TEMPERATURE: 98 F | SYSTOLIC BLOOD PRESSURE: 105 MMHG | RESPIRATION RATE: 18 BRPM | DIASTOLIC BLOOD PRESSURE: 71 MMHG | HEART RATE: 68 BPM | OXYGEN SATURATION: 99 % | WEIGHT: 173.06 LBS

## 2023-10-01 DIAGNOSIS — Z96.642 PRESENCE OF LEFT ARTIFICIAL HIP JOINT: Chronic | ICD-10-CM

## 2023-10-01 PROCEDURE — 12013 RPR F/E/E/N/L/M 2.6-5.0 CM: CPT

## 2023-10-01 PROCEDURE — G1004: CPT

## 2023-10-01 PROCEDURE — 72125 CT NECK SPINE W/O DYE: CPT | Mod: 26,MG

## 2023-10-01 PROCEDURE — 99285 EMERGENCY DEPT VISIT HI MDM: CPT | Mod: 25

## 2023-10-01 PROCEDURE — 70450 CT HEAD/BRAIN W/O DYE: CPT | Mod: 26,MG

## 2023-10-01 PROCEDURE — 73502 X-RAY EXAM HIP UNI 2-3 VIEWS: CPT | Mod: 26,LT

## 2023-10-01 RX ORDER — DOBUTAMINE HCL 250MG/20ML
2.46 VIAL (ML) INTRAVENOUS
Qty: 500 | Refills: 0 | Status: DISCONTINUED | OUTPATIENT
Start: 2023-10-01 | End: 2023-10-09

## 2023-10-01 RX ADMIN — Medication 5.8 MICROGRAM(S)/KG/MIN: at 16:47

## 2023-10-01 NOTE — ED ADULT NURSE NOTE - SUICIDE SCREENING QUESTION 2
Cameron Law is a 68 year old male presenting for a wound check.       ALLERGIES:  Ace inhibitors and Morphine  Tobacco history reviewed  Medications reviewed and updated   Health Maintenance Summary     Topic Due On Due Status Completed On Postpone Until Reason    Colorectal Cancer Screening - Colonoscopy Nov 13, 2002 Overdue       Diabetes Eye Exam Nov 13, 1970 Overdue       Glycohemoglobin A1C  (Diabetes Sugar)  Jul 29, 2019 Not Due Apr 29, 2019      GFR  (Kidney Function Test)  Apr 29, 2020 Not Due Apr 29, 2019      Diabetes Foot Exam  May 1, 2020 Not Due May 1, 2019      Medicare Wellness Visit May 1, 2020 Not Due May 1, 2019      IMMUNIZATION - DTaP/Tdap/Td Mar 11, 2010 Overdue Mar 10, 2010      Immunization-Influenza  Completed Oct 12, 2018      Depression Screening May 1, 2020 Not Due May 1, 2019      Hepatitis C Screening  Completed Apr 29, 2019      Pneumococcal Vaccine 65+ High/Highest Risk Oct 20, 2021 Not Due Apr 10, 2018      Immunization - Shingles Nov 13, 2002 Postponed  Jun 20, 2019 Insufficient Supply    Immunization - MMR  Hidden       IMMUNIZATION - MENINGITIS SEROGROUP B  Hidden             Patient is due for the topics as listed above.        No

## 2023-10-01 NOTE — ED PROVIDER NOTE - ATTENDING CONTRIBUTION TO CARE
This patient was signed out to me by Dr. Hopkins. I followed up the patient with Dr. Geiger and directly supervised all patient care.

## 2023-10-01 NOTE — ED PROVIDER NOTE - PROGRESS NOTE DETAILS
Maria Isabel Geiger, DO: received sign out from Dr. Hopkins. CT head/neck without acute findings. No obvious fracture on XR hip. Family called service for RN to come set up patient's dobutamine infusion however given the time, was told there's no RN available and they have to check what time there is RN availability tomorrow morning to set up patient's infusion. Patient will remain in ED until RN available for infusion set up.

## 2023-10-01 NOTE — ED PROVIDER NOTE - CLINICAL SUMMARY MEDICAL DECISION MAKING FREE TEXT BOX
Pt with fall and head injury.  pt on dobutamine infusion at home. switched to our machines here. order placed.  laceration repaired. Pt pending CTs and if neg would d/c. Pt signed out to dr. pinedo.

## 2023-10-01 NOTE — ED ADULT NURSE NOTE - OBJECTIVE STATEMENT
Patient presented to ED complaining of Fall. Patient AXOX4, advanced CHF on dobutamine drip fell getting of the recliner hitting his head with laceration. Denies LOC. Patient on a curlin pump.

## 2023-10-01 NOTE — ED PROVIDER NOTE - PHYSICAL EXAMINATION
Constitutional - well-developed.   Head - midline forehead laceration 3 cm. Airway patent.   Eyes - PERRL.   CV - RRR. no murmur. no edema.   Pulm - CTAB.   Abd - soft, nt. no rebound. no guarding.   Neuro - A&Ox3. strength 5/5 x4. sensation intact x4. normal gait.   Skin - No rash. .  MSK - normal ROM.

## 2023-10-01 NOTE — ED PROVIDER NOTE - OBJECTIVE STATEMENT
Pt is a 66 yo M co head injury. Pt with a hx of advance CHF on a constant dobutamine infusion, htn, IDDM.  Pt was trying to transfer from his wheelchair today and lost his balance and fell and hit his forehead.  no loc.  no other complaints.

## 2023-10-01 NOTE — ED ADULT TRIAGE NOTE - CHIEF COMPLAINT QUOTE
mechanical fall while getting out of recliner. lac to forehead bleeding controlled. picc in left arm "bag has to be changed at 2". budesonide infusion. denies loc denies thinners.

## 2023-10-01 NOTE — ED ADULT NURSE NOTE - NSFALLHARMRISKINTERV_ED_ALL_ED

## 2023-10-02 VITALS
DIASTOLIC BLOOD PRESSURE: 60 MMHG | SYSTOLIC BLOOD PRESSURE: 103 MMHG | OXYGEN SATURATION: 99 % | HEART RATE: 73 BPM | TEMPERATURE: 98 F

## 2023-10-02 LAB — GLUCOSE BLDC GLUCOMTR-MCNC: 118 MG/DL — HIGH (ref 70–99)

## 2023-10-02 PROCEDURE — 82962 GLUCOSE BLOOD TEST: CPT

## 2023-10-02 PROCEDURE — 99235 HOSP IP/OBS SAME DATE MOD 70: CPT | Mod: GC

## 2023-10-02 PROCEDURE — 72125 CT NECK SPINE W/O DYE: CPT | Mod: MG

## 2023-10-02 PROCEDURE — 96374 THER/PROPH/DIAG INJ IV PUSH: CPT | Mod: XU

## 2023-10-02 PROCEDURE — 70450 CT HEAD/BRAIN W/O DYE: CPT | Mod: MG

## 2023-10-02 PROCEDURE — 73502 X-RAY EXAM HIP UNI 2-3 VIEWS: CPT

## 2023-10-02 PROCEDURE — G0378: CPT

## 2023-10-02 PROCEDURE — 99284 EMERGENCY DEPT VISIT MOD MDM: CPT | Mod: 25

## 2023-10-02 PROCEDURE — 12013 RPR F/E/E/N/L/M 2.6-5.0 CM: CPT

## 2023-10-02 PROCEDURE — G1004: CPT

## 2023-10-02 RX ORDER — TRAMADOL HYDROCHLORIDE 50 MG/1
50 TABLET ORAL EVERY 6 HOURS
Refills: 0 | Status: DISCONTINUED | OUTPATIENT
Start: 2023-10-02 | End: 2023-10-02

## 2023-10-02 RX ORDER — FEBUXOSTAT 40 MG/1
80 TABLET ORAL DAILY
Refills: 0 | Status: DISCONTINUED | OUTPATIENT
Start: 2023-10-02 | End: 2023-10-09

## 2023-10-02 RX ORDER — BUMETANIDE 0.25 MG/ML
1 INJECTION INTRAMUSCULAR; INTRAVENOUS DAILY
Refills: 0 | Status: DISCONTINUED | OUTPATIENT
Start: 2023-10-02 | End: 2023-10-09

## 2023-10-02 RX ORDER — MIDODRINE HYDROCHLORIDE 2.5 MG/1
15 TABLET ORAL THREE TIMES A DAY
Refills: 0 | Status: DISCONTINUED | OUTPATIENT
Start: 2023-10-02 | End: 2023-10-09

## 2023-10-02 RX ORDER — ASPIRIN/CALCIUM CARB/MAGNESIUM 324 MG
81 TABLET ORAL DAILY
Refills: 0 | Status: DISCONTINUED | OUTPATIENT
Start: 2023-10-02 | End: 2023-10-09

## 2023-10-02 RX ORDER — MIRTAZAPINE 45 MG/1
15 TABLET, ORALLY DISINTEGRATING ORAL ONCE
Refills: 0 | Status: COMPLETED | OUTPATIENT
Start: 2023-10-02 | End: 2023-10-02

## 2023-10-02 RX ORDER — SPIRONOLACTONE 25 MG/1
25 TABLET, FILM COATED ORAL DAILY
Refills: 0 | Status: DISCONTINUED | OUTPATIENT
Start: 2023-10-02 | End: 2023-10-09

## 2023-10-02 RX ORDER — QUETIAPINE FUMARATE 200 MG/1
150 TABLET, FILM COATED ORAL ONCE
Refills: 0 | Status: COMPLETED | OUTPATIENT
Start: 2023-10-02 | End: 2023-10-02

## 2023-10-02 RX ADMIN — SPIRONOLACTONE 25 MILLIGRAM(S): 25 TABLET, FILM COATED ORAL at 05:10

## 2023-10-02 RX ADMIN — Medication 81 MILLIGRAM(S): at 11:48

## 2023-10-02 RX ADMIN — MIDODRINE HYDROCHLORIDE 15 MILLIGRAM(S): 2.5 TABLET ORAL at 05:11

## 2023-10-02 RX ADMIN — QUETIAPINE FUMARATE 150 MILLIGRAM(S): 200 TABLET, FILM COATED ORAL at 00:43

## 2023-10-02 RX ADMIN — BUMETANIDE 1 MILLIGRAM(S): 0.25 INJECTION INTRAMUSCULAR; INTRAVENOUS at 05:10

## 2023-10-02 RX ADMIN — MIDODRINE HYDROCHLORIDE 15 MILLIGRAM(S): 2.5 TABLET ORAL at 11:48

## 2023-10-02 RX ADMIN — MIRTAZAPINE 15 MILLIGRAM(S): 45 TABLET, ORALLY DISINTEGRATING ORAL at 00:42

## 2023-10-02 RX ADMIN — FEBUXOSTAT 80 MILLIGRAM(S): 40 TABLET ORAL at 11:47

## 2023-10-02 NOTE — ED CDU PROVIDER DISPOSITION NOTE - PATIENT PORTAL LINK FT
You can access the FollowMyHealth Patient Portal offered by [facility] by registering at the following website: [email or web address]. By joining [facility]’s FollowMyHealth portal, you will also be able to view your health information using other applications (apps) compatible with our system.

## 2023-10-02 NOTE — ED CDU PROVIDER INITIAL DAY NOTE - ATTENDING CONTRIBUTION TO CARE
I performed a history and physical exam of the patient and discussed their management with the resident. I reviewed the resident's note and agree with the documented findings and plan of care. My medical decision making and observations are found below.     67-year-old male with history of diabetes, gout, hyperlipidemia, CAD, CHF on dobutamine infusion presenting with head injury status post mechanical fall.  No intracranial hemorrhage noted, laceration repaired.  Patient pending social work for facilitation of transfer back  home.

## 2023-10-02 NOTE — ED ADULT NURSE REASSESSMENT NOTE - NEURO ASSESSMENT
- - -
- - -
-pt on coumadin 3mg and metoprolol at home  -12 lead EKG with atrial flutter in 90's   -currently rate controlled on Metoprolol  -s/p tele monitoring   -supra- therapeutic INR to 4.53 today, goal 2-3  -cont to hold Coumadin   -Mon INR

## 2023-10-02 NOTE — ED CDU PROVIDER INITIAL DAY NOTE - CLINICAL SUMMARY MEDICAL DECISION MAKING FREE TEXT BOX
Patient evaluated for head injury. CT head/neck without acute findings. XR of hip also done given family concern of hx hip replacement, no acute findings. Patient has continuous dobutamine infusion for advanced CHF, RN from the service unable to come tonight to set up pump/infusion, will come in the AM. Once infusion is set up can dc home.

## 2023-10-02 NOTE — ED CDU PROVIDER DISPOSITION NOTE - CLINICAL COURSE
Patient's wife and home-care nurse came to bedside; patient's home dobutamine was connected to home care pump; Patient now safe and stable for d/c home via ambulance.

## 2023-10-02 NOTE — ED CDU PROVIDER DISPOSITION NOTE - NSFOLLOWUPINSTRUCTIONS_ED_ALL_ED_FT
Follow up with your home care team and cardiologist this week.  Return to Emergency Department if any new or worsening symptoms

## 2023-10-02 NOTE — ED CDU PROVIDER INITIAL DAY NOTE - OBJECTIVE STATEMENT
67 year old male with PMHx advanced CHF on constant dobutamine infusion, HTN, DM presenting for head injury.  Patient was trying to transfer from his wheelchair today and lost his balance and fell and hit his forehead.  no loc.  no other complaints.

## 2023-10-02 NOTE — ED ADULT NURSE REASSESSMENT NOTE - NS ED NURSE REASSESS COMMENT FT1
Assumed care of patient at 16:45 from MARY Christensen. Charting as noted. Patient A&Ox4, resp even/unlabored, presents to ED c/o fall, transferred to critical for higher level of care. At time of assessment, patient denies pain/discomfort, denies CP/SOB. Patient updated on the plan of care, awaiting CT. Stretcher locked in lowest position, PICC site flushed w/ NS as per MD Hopkins's order. No redness, swelling or pain noted to site, +blood return from both ports. No signs of acute distress noted, safety maintained. Pt placed on CM in NSR, rate 77, SpO2 99% on room air. Pt's wife and brother at bed side, emotional support provided.
PT A&Ox4. Assumed care of pt @ 1920, Cardiac monitor in place. PT is currently on a Dobutamin drip @ 5.8mL/hr.  At this time VSS.  PT awaiting head CT.  Pt is refusing CT scan at this time.  MD Geiger made aware. Family at bedside. Will continue with current treatment plan.
Patient sent CC report given to MARY Marin. Patient on Dobutamine drip. Patient in no distress. spouse at bedside.
Assumed care of pt at 0030 as stated in report from MARY Christensen. Charting as noted. Patient A&O x4, denies pain/discomfort, denies CP/SOB. Updated on the plan of care. Call bell within reach, bed locked in lowest position. IV site flushed w/ NS. No redness, swelling or pain noted to site. No signs of acute distress noted, safety maintained. Pt remains on CM in NSR.
infusion nurse at bedside to place pt on infusion for transport home. pt cleaned and placed in a new gown. pt repositioned at this time. wife remains at bedside. pt awaiting for the ambulance at this time for d/c.
pt care assumed at 0730, no apparent distress noted at this time, charting as noted. Pt received resting in bed comfortably at this time. pt is stable on cardiac monitor. Dobutamine infusion running as per orders. MD farmer at bedside for eval. pt awaiting consult from LIZETTE.
pt on cardiac monitor in NSR, pt resting comfortably showing no signs of respiratory distress or pain, the pt is calm and cooeprative

## 2023-12-13 NOTE — ED CDU PROVIDER INITIAL DAY NOTE - PHYSICAL EXAMINATION
Implemented All Universal Safety Interventions:  [place] to call system. Call bell, personal items and telephone within reach. Instruct patient to call for assistance. Room bathroom lighting operational. Non-slip footwear when patient is off stretcher. Physically safe environment: no spills, clutter or unnecessary equipment. Stretcher in lowest position, wheels locked, appropriate side rails in place.
General: well-nourished, no acute distress  Head: midline forehead laceration 3 cm. Normocephalic. Airway patent.   Eyes: PERRL.   CV: RRR. no murmur. no edema.   Pulm: Speaking clearly in full sentences, no respiratory distress, no wheezing  Abd: soft, nondistended, nontender to palpation. no rebound. no guarding.   Neuro: A&Ox3. strength 5/5 x4. sensation intact x4.   Skin: No rash.  MSK: normal ROM.
150